# Patient Record
Sex: FEMALE | Race: WHITE | Employment: FULL TIME | URBAN - METROPOLITAN AREA
[De-identification: names, ages, dates, MRNs, and addresses within clinical notes are randomized per-mention and may not be internally consistent; named-entity substitution may affect disease eponyms.]

---

## 2019-05-29 LAB
HBSAG, EXTERNAL: NEGATIVE
HEPATITIS C AB,   EXT: NORMAL
HIV, EXTERNAL: NORMAL
RPR, EXTERNAL: NORMAL
RUBELLA, EXTERNAL: NORMAL

## 2019-08-14 PROBLEM — O99.340 DEPRESSION AFFECTING PREGNANCY: Status: ACTIVE | Noted: 2019-08-14

## 2019-08-14 PROBLEM — O09.522 MULTIGRAVIDA OF ADVANCED MATERNAL AGE IN SECOND TRIMESTER: Status: ACTIVE | Noted: 2019-08-14

## 2019-08-14 PROBLEM — O09.529 ADVANCED MATERNAL AGE IN MULTIGRAVIDA: Status: ACTIVE | Noted: 2019-08-14

## 2019-08-14 PROBLEM — F32.A DEPRESSION AFFECTING PREGNANCY: Status: ACTIVE | Noted: 2019-08-14

## 2019-09-16 PROBLEM — O26.892 HEADACHE IN PREGNANCY, ANTEPARTUM, SECOND TRIMESTER: Status: ACTIVE | Noted: 2019-09-16

## 2019-09-16 PROBLEM — O99.712: Status: ACTIVE | Noted: 2019-09-16

## 2019-09-16 PROBLEM — L29.9: Status: ACTIVE | Noted: 2019-09-16

## 2019-09-16 PROBLEM — R51.9 HEADACHE IN PREGNANCY, ANTEPARTUM, SECOND TRIMESTER: Status: ACTIVE | Noted: 2019-09-16

## 2019-10-09 PROBLEM — O35.9XX0 SUSPECTED FETAL ANOMALY, ANTEPARTUM: Status: ACTIVE | Noted: 2019-10-09

## 2019-10-31 PROBLEM — O09.523 MULTIGRAVIDA OF ADVANCED MATERNAL AGE IN THIRD TRIMESTER: Status: ACTIVE | Noted: 2019-08-14

## 2019-10-31 PROBLEM — O26.893 HEADACHE IN PREGNANCY, THIRD TRIMESTER: Status: ACTIVE | Noted: 2019-09-16

## 2019-10-31 PROBLEM — O99.713 PRURITUS OF PREGNANCY IN THIRD TRIMESTER: Status: ACTIVE | Noted: 2019-09-16

## 2019-11-05 PROBLEM — O40.3XX0 POLYHYDRAMNIOS IN THIRD TRIMESTER: Status: ACTIVE | Noted: 2019-11-05

## 2019-11-05 PROBLEM — Z87.42 HISTORY OF VULVODYNIA: Status: ACTIVE | Noted: 2019-11-05

## 2019-12-13 ENCOUNTER — HOSPITAL ENCOUNTER (EMERGENCY)
Age: 39
Discharge: HOME | End: 2019-12-13
Attending: OBSTETRICS & GYNECOLOGY | Admitting: OBSTETRICS & GYNECOLOGY
Payer: COMMERCIAL

## 2019-12-13 VITALS
HEART RATE: 91 BPM | TEMPERATURE: 98 F | RESPIRATION RATE: 20 BRPM | DIASTOLIC BLOOD PRESSURE: 79 MMHG | BODY MASS INDEX: 30.9 KG/M2 | SYSTOLIC BLOOD PRESSURE: 130 MMHG | WEIGHT: 181 LBS | HEIGHT: 64 IN

## 2019-12-13 PROBLEM — O26.893 PELVIC PAIN AFFECTING PREGNANCY IN THIRD TRIMESTER, ANTEPARTUM: Status: ACTIVE | Noted: 2019-12-13

## 2019-12-13 PROBLEM — R10.2 PELVIC PAIN AFFECTING PREGNANCY IN THIRD TRIMESTER, ANTEPARTUM: Status: ACTIVE | Noted: 2019-12-13

## 2019-12-13 LAB
GLUCOSE, GLUUPC: NORMAL
KETONES UR-MCNC: NORMAL MG/DL
PROT UR QL: NEGATIVE

## 2019-12-13 PROCEDURE — 99283 EMERGENCY DEPT VISIT LOW MDM: CPT

## 2019-12-13 PROCEDURE — 74011250636 HC RX REV CODE- 250/636: Performed by: OBSTETRICS & GYNECOLOGY

## 2019-12-13 PROCEDURE — 81002 URINALYSIS NONAUTO W/O SCOPE: CPT | Performed by: OBSTETRICS & GYNECOLOGY

## 2019-12-13 PROCEDURE — 74011250637 HC RX REV CODE- 250/637: Performed by: OBSTETRICS & GYNECOLOGY

## 2019-12-13 PROCEDURE — 96372 THER/PROPH/DIAG INJ SC/IM: CPT

## 2019-12-13 PROCEDURE — 59025 FETAL NON-STRESS TEST: CPT

## 2019-12-13 RX ORDER — HYDROCODONE BITARTRATE AND ACETAMINOPHEN 5; 325 MG/1; MG/1
1 TABLET ORAL
Status: COMPLETED | OUTPATIENT
Start: 2019-12-13 | End: 2019-12-13

## 2019-12-13 RX ORDER — BETAMETHASONE SODIUM PHOSPHATE AND BETAMETHASONE ACETATE 3; 3 MG/ML; MG/ML
12 INJECTION, SUSPENSION INTRA-ARTICULAR; INTRALESIONAL; INTRAMUSCULAR; SOFT TISSUE EVERY 24 HOURS
Status: DISCONTINUED | OUTPATIENT
Start: 2019-12-13 | End: 2019-12-13 | Stop reason: HOSPADM

## 2019-12-13 RX ADMIN — BETAMETHASONE SODIUM PHOSPHATE AND BETAMETHASONE ACETATE 12 MG: 3; 3 INJECTION, SUSPENSION INTRA-ARTICULAR; INTRALESIONAL; INTRAMUSCULAR at 15:10

## 2019-12-13 RX ADMIN — HYDROCODONE BITARTRATE AND ACETAMINOPHEN 1 TABLET: 5; 325 TABLET ORAL at 22:55

## 2019-12-13 NOTE — PROGRESS NOTES
Received phone call from [facility] office. Patient to arrive at 1500 for a celestone shot today and again to come back at 1500 tomorrow for second dose of celestone.

## 2019-12-13 NOTE — PROGRESS NOTES
Celestone given, see MAR. Patient instructed to come back tomorrow at this time, verbalizes understanding and agreement. Patient d/c home.

## 2019-12-14 ENCOUNTER — HOSPITAL ENCOUNTER (EMERGENCY)
Age: 39
Discharge: HOME | End: 2019-12-14
Attending: OBSTETRICS & GYNECOLOGY | Admitting: OBSTETRICS & GYNECOLOGY
Payer: COMMERCIAL

## 2019-12-14 PROCEDURE — 74011250636 HC RX REV CODE- 250/636: Performed by: OBSTETRICS & GYNECOLOGY

## 2019-12-14 PROCEDURE — 96372 THER/PROPH/DIAG INJ SC/IM: CPT

## 2019-12-14 RX ORDER — BETAMETHASONE SODIUM PHOSPHATE AND BETAMETHASONE ACETATE 3; 3 MG/ML; MG/ML
12 INJECTION, SUSPENSION INTRA-ARTICULAR; INTRALESIONAL; INTRAMUSCULAR; SOFT TISSUE
Status: COMPLETED | OUTPATIENT
Start: 2019-12-14 | End: 2019-12-14

## 2019-12-14 RX ORDER — BETAMETHASONE SODIUM PHOSPHATE AND BETAMETHASONE ACETATE 3; 3 MG/ML; MG/ML
12 INJECTION, SUSPENSION INTRA-ARTICULAR; INTRALESIONAL; INTRAMUSCULAR; SOFT TISSUE ONCE
Status: DISCONTINUED | OUTPATIENT
Start: 2019-12-14 | End: 2019-12-14

## 2019-12-14 RX ADMIN — BETAMETHASONE SODIUM PHOSPHATE AND BETAMETHASONE ACETATE 12 MG: 3; 3 INJECTION, SUSPENSION INTRA-ARTICULAR; INTRALESIONAL; INTRAMUSCULAR at 15:22

## 2019-12-14 NOTE — PROGRESS NOTES
Pt presents to L&D with complaints of tightening in abdomen and lower back pain most of the day. Pt states she has been drinking water and resting, but pain and discomfort continues. Dr. El Vora aware pt in FLAVIO 1.

## 2019-12-14 NOTE — DISCHARGE INSTRUCTIONS
Patient Education   Patient Education        Counting Your Baby's Kicks: Care Instructions  Your Care Instructions    Counting your baby's kicks is one way your doctor can tell that your baby is healthy. Most women--especially in a first pregnancy--feel their baby move for the first time between 16 and 22 weeks. The movement may feel like flutters rather than kicks. Your baby may move more at certain times of the day. When you are active, you may notice less kicking than when you are resting. At your prenatal visits, your doctor will ask whether the baby is active. In your last trimester, your doctor may ask you to count the number of times you feel your baby move. Follow-up care is a key part of your treatment and safety. Be sure to make and go to all appointments, and call your doctor if you are having problems. It's also a good idea to know your test results and keep a list of the medicines you take. How do you count fetal kicks? · A common method of checking your baby's movement is to count the number of kicks or moves you feel in 1 hour. Ten movements (such as kicks, flutters, or rolls) in 1 hour are normal. Some doctors suggest that you count in the morning until you get to 10 movements. Then you can quit for that day and start again the next day. · Pick your baby's most active time of day to count. This may be any time from morning to evening. · If you do not feel 10 movements in an hour, your baby may be sleeping. Wait for the next hour and count again. When should you call for help? Call your doctor now or seek immediate medical care if:    · You noticed that your baby has stopped moving or is moving much less than normal.    Watch closely for changes in your health, and be sure to contact your doctor if you have any problems. Where can you learn more? Go to http://austin-jose.info/.   Enter Y027 in the search box to learn more about \"Counting Your Baby's Kicks: Care Instructions. \"  Current as of: May 29, 2019  Content Version: 12.2  © 7243-1435 [employer]. Care instructions adapted under license by [employer] (which disclaims liability or warranty for this information). If you have questions about a medical condition or this instruction, always ask your healthcare professional. [address] any warranty or liability for your use of this information.  Labor: Care Instructions  Your Care Instructions     labor is the start of labor between 21 and 36 weeks of pregnancy. Most babies are born at 40 to 41 weeks of pregnancy. In labor, the uterus contracts to open the cervix. This is the first stage of childbirth.  labor can be caused by a problem with the baby, the mother, or both. Often the cause is not known. In some cases, doctors use medicines to try to delay labor until 29 or more weeks of pregnancy. By this time, a baby has grown enough so that problems are not likely. In some cases--such as with a serious infection--it is healthier for the baby to be born early. Your treatment will depend on how far along you are in your pregnancy and on your health and your baby's health. Follow-up care is a key part of your treatment and safety. Be sure to make and go to all appointments, and call your doctor if you are having problems. It's also a good idea to know your test results and keep a list of the medicines you take. How can you care for yourself at home? · If your doctor prescribed medicines, take them exactly as directed. Call your doctor if you think you are having a problem with your medicine. · Rest until your doctor advises you about activity. He or she will tell you if you should stay in bed most of the time. You may need to arrange for  if you have young children. · Do not have sexual intercourse unless your doctor says it is safe.   · Use pads, not tampons, if you have vaginal bleeding. · Make sure to drink plenty of fluids. Dehydration can lead to contractions. If you have kidney, heart, or liver disease and have to limit fluids, talk with your doctor before you increase the amount of fluids you drink. · Do not smoke or allow others to smoke around you. If you need help quitting, talk to your doctor about stop-smoking programs and medicines. These can increase your chances of quitting for good. When should you call for help? Call 911 anytime you think you may need emergency care. For example, call if:    · You passed out (lost consciousness).     · You have a seizure.     · You have severe vaginal bleeding.     · You have severe pain in your belly or pelvis.     · You have had fluid gushing or leaking from your vagina and you know or think the umbilical cord is bulging into your vagina. If this happens, immediately get down on your knees so your rear end (buttocks) is higher than your head. This will decrease the pressure on the cord until help arrives.   [facility] your doctor now or seek immediate medical care if:    · You have signs of preeclampsia, such as:  ? Sudden swelling of your face, hands, or feet. ? New vision problems (such as dimness, blurring, or seeing spots). ? A severe headache.     · You have any vaginal bleeding.     · You have belly pain or cramping.     · You have a fever.     · You have had regular contractions (with or without pain) for an hour. This means that you have 6 or more within 1 hour after you change your position and drink fluids.     · You have a sudden release of fluid from the vagina.     · You have low back pain or pelvic pressure that does not go away.     · You notice that your baby has stopped moving or is moving much less than normal.    Watch closely for changes in your health, and be sure to contact your doctor if you have any problems. Where can you learn more? Go to http://austin-jose.info/.   Enter Q400 in the search box to learn more about \" Labor: Care Instructions. \"  Current as of: May 29, 2019  Content Version: 12.2  © 7908-2345 [employer]. Care instructions adapted under license by [employer] (which disclaims liability or warranty for this information). If you have questions about a medical condition or this instruction, always ask your healthcare professional. [address] any warranty or liability for your use of this information.

## 2019-12-14 NOTE — PROGRESS NOTES
Patient discharged to home in stable condition with labor precautions and kick counts. Patient and significant other verbalize understanding of these.

## 2019-12-15 ENCOUNTER — HOSPITAL ENCOUNTER (OUTPATIENT)
Age: 39
Discharge: HOME | End: 2019-12-15
Attending: OBSTETRICS & GYNECOLOGY | Admitting: OBSTETRICS & GYNECOLOGY
Payer: COMMERCIAL

## 2019-12-15 VITALS
TEMPERATURE: 97.6 F | SYSTOLIC BLOOD PRESSURE: 126 MMHG | DIASTOLIC BLOOD PRESSURE: 73 MMHG | HEART RATE: 90 BPM | RESPIRATION RATE: 18 BRPM

## 2019-12-15 PROBLEM — O36.8130 DECREASED FETAL MOVEMENT AFFECTING MANAGEMENT OF PREGNANCY IN THIRD TRIMESTER: Status: ACTIVE | Noted: 2019-12-15

## 2019-12-15 PROCEDURE — 59025 FETAL NON-STRESS TEST: CPT

## 2019-12-15 PROCEDURE — 99282 EMERGENCY DEPT VISIT SF MDM: CPT

## 2019-12-15 RX ORDER — TEMAZEPAM 15 MG/1
CAPSULE ORAL
COMMUNITY

## 2019-12-15 NOTE — PROGRESS NOTES
Pt to room FLAVIO 1 for triage with chief complaint of decreased fetal movement and back pain for 3 weeks. Pt reports taking Restoril for sleep last night at 2200. She reports noticing a decrease in movement after taking medication. Assessment begins, EFM and [place] applied to a soft non tender abdomen and tracing well. Dr Patrica Clancy called to assess patient.

## 2019-12-15 NOTE — H&P
Chief Complaint: decreased FM      37 y.o. female  at 35w6d  weeks gestation who is seen for decreased FM this morning since taking restoril for insomnia last night. Pt denies VB, LOF, uterine ctx, abdominal pain, UTI or PEC symptoms. Pt is scheuled to see her PObP in 2 days. HISTORY:    Social History     Substance and Sexual Activity   Sexual Activity Not Currently    Partners: Male     Patient's last menstrual period was 03/15/2019.     Social History     Socioeconomic History    Marital status:      Spouse name: Not on file    Number of children: Not on file    Years of education: Not on file    Highest education level: Not on file   Occupational History    Not on file   Social Needs    Financial resource strain: Not on file    Food insecurity:     Worry: Not on file     Inability: Not on file    Transportation needs:     Medical: Not on file     Non-medical: Not on file   Tobacco Use    Smoking status: Never Smoker    Smokeless tobacco: Never Used   Substance and Sexual Activity    Alcohol use: No     Comment: very rarely, weekends    Drug use: No    Sexual activity: Not Currently     Partners: Male   Lifestyle    Physical activity:     Days per week: Not on file     Minutes per session: Not on file    Stress: Not on file   Relationships    Social connections:     Talks on phone: Not on file     Gets together: Not on file     Attends [employer] service: Not on file     Active member of club or organization: Not on file     Attends meetings of clubs or organizations: Not on file     Relationship status: Not on file    Intimate partner violence:     Fear of current or ex partner: Not on file     Emotionally abused: Not on file     Physically abused: Not on file     Forced sexual activity: Not on file   Other Topics Concern     Service Not Asked    Blood Transfusions Not Asked    Caffeine Concern Not Asked    Occupational Exposure Not Asked   [place] [employer] Not Asked    Sleep Concern Not Asked    Stress Concern Not Asked    Weight Concern Not Asked    Special Diet Not Asked    Back Care Not Asked    Exercise Not Asked    Bike Helmet Not Asked   [address] Not Asked    Self-Exams Not Asked   Social History Narrative    Not on file       Past Surgical History:   Procedure Laterality Date    HX BREAST AUGMENTATION      HX BREAST REDUCTION      HX DILATION AND CURETTAGE      HX GYN  2000    laser ablation for cervical dysplasia    HX OTHER SURGICAL      resection of melanoma right breast    HX OTHER SURGICAL      sinus surgery, 2 deviated septums (2016)    HX TYMPANOSTOMY Right     tube in ear twice for ETD       Past Medical History:   Diagnosis Date    Abnormal Pap smear 2000    with laser ablation     Abnormal Papanicolaou smear of cervix     ADHD (attention deficit hyperactivity disorder)     Allergic rhinitis 09/26/2016    Overactive ,sensitivities    Autoimmune disease ([facility] [address]     fibromyalgia    Bacterial infection     Carrier of ureaplasma urealyticum     Chronic sinusitis 9/26/2016    Depression     DNS (deviated nasal septum) 9/26/2016    Fibromyalgia     GERD (gastroesophageal reflux disease)     diet controlled    Hiatal hernia 9/26/2016    HSV (herpes simplex virus) anogenital infection     Hypertrophy of nasal turbinates 9/26/2016    IC (interstitial cystitis) Oct 2013    dx by Jerilyn Riggs,    [facility])     on right breast    Mycoplasma infection 2/10/2016    Nasal obstruction 9/26/2016    Nerve pain 9/26/2016    In face    Ovarian cyst     Rosacea 9/26/2016    Thyroid nodule 9/26/2016    Vulvodynia     hx         ROS:  An 8 point review of symptoms negative except for chief complaint as described above. PHYSICAL EXAM:  Blood pressure 126/73, pulse 90, temperature 97.6 °F (36.4 °C), resp. rate 18, last menstrual period 03/15/2019. Constitutional: The patient appears well, alert, oriented x 3.     Cardiovascular: Heart RRR, no murmurs. Respiratory: Lungs clear, no respiratory distress  GI: Abdomen soft, nontender, no guarding  No fundal tenderness  Musculoskeletal: no cva tenderness  Lower ext: no edema, neg darrick's, reflexes +2  Skin: no rashes or lesions  Psychiatric:Mood/ Affect: appropriate  Genitourinary: SVE: deferred  FHT: Category 1 with mod variability and + accels; reactive  TOCO: no ctx    I personally reviewed pt's medical record including relevant labs and ultrasounds  I reviewed the NST at today's encounter    Assessment/Plan:  Pt presents with decreased FM after taking restoril. Pt with ICP and has an appointment with her PObP in 2 days. Fetal well being is demonstrated. Pt discharged to home with labor and KK precautions. Pt to f/u with her PObP as per her appointment.

## 2019-12-15 NOTE — DISCHARGE INSTRUCTIONS
Patient Education       Patient Education        Pregnancy Precautions: Care Instructions  Your Care Instructions    There is no sure way to prevent labor before your due date ( labor) or to prevent most other pregnancy problems. But there are things you can do to increase your chances of a healthy pregnancy. Go to your appointments, follow your doctor's advice, and take good care of yourself. Eat well, and exercise (if your doctor agrees). And make sure to drink plenty of water. Follow-up care is a key part of your treatment and safety. Be sure to make and go to all appointments, and call your doctor if you are having problems. It's also a good idea to know your test results and keep a list of the medicines you take. How can you care for yourself at home? · Make sure you go to your prenatal appointments. At each visit, your doctor will check your blood pressure. Your doctor will also check to see if you have protein in your urine. High blood pressure and protein in urine are signs of preeclampsia. This condition can be dangerous for you and your baby. · Drink plenty of fluids, enough so that your urine is light yellow or clear like water. Dehydration can cause contractions. If you have kidney, heart, or liver disease and have to limit fluids, talk with your doctor before you increase the amount of fluids you drink. · Tell your doctor right away if you notice any symptoms of an infection, such as:  ? Burning when you urinate. ? A foul-smelling discharge from your vagina. ? Vaginal itching. ? Unexplained fever. ? Unusual pain or soreness in your uterus or lower belly. · Eat a balanced diet. Include plenty of foods that are high in calcium and iron. ? Foods high in calcium include milk, cheese, yogurt, almonds, and broccoli. ? Foods high in iron include red meat, shellfish, poultry, eggs, beans, raisins, whole-grain bread, and leafy green vegetables. · Do not smoke.  If you need help quitting, talk to your doctor about stop-smoking programs and medicines. These can increase your chances of quitting for good. · Do not drink alcohol or use illegal drugs. · Follow your doctor's directions about activity. Your doctor will let you know how much, if any, exercise you can do. · Ask your doctor if you can have sex. If you are at risk for early labor, your doctor may ask you to not have sex. · Take care to prevent falls. During pregnancy, your joints are loose, and your balance is off. Sports such as bicycling, skiing, or in-line skating can increase your risk of falling. And don't ride horses or motorcycles, dive, water ski, scuba dive, or parachute jump while you are pregnant. · Avoid getting very hot. Do not use saunas or hot tubs. Avoid staying out in the sun in hot weather for long periods. Take acetaminophen (Tylenol) to lower a high fever. · Do not take any over-the-counter or herbal medicines or supplements without talking to your doctor or pharmacist first.  When should you call for help? Call 911 anytime you think you may need emergency care. For example, call if:    · You passed out (lost consciousness).     · You have a seizure.     · You have severe vaginal bleeding.     · You have severe pain in your belly or pelvis.     · You have had fluid gushing or leaking from your vagina and you know or think the umbilical cord is bulging into your vagina. If this happens, immediately get down on your knees so your rear end (buttocks) is higher than your head. This will decrease the pressure on the cord until help arrives.   [facility] your doctor now or seek immediate medical care if:    · You have signs of preeclampsia, such as:  ? Sudden swelling of your face, hands, or feet. ? New vision problems (such as dimness, blurring, or seeing spots).   ? A severe headache.     · You have any vaginal bleeding.     · You have belly pain or cramping.     · You have a fever.     · You have had regular contractions (with or without pain) for an hour. This means that you have 8 or more within 1 hour or 4 or more in 20 minutes after you change your position and drink fluids.     · You have a sudden release of fluid from your vagina.     · You have low back pain or pelvic pressure that does not go away.     · You notice that your baby has stopped moving or is moving much less than normal.    Watch closely for changes in your health, and be sure to contact your doctor if you have any problems. Where can you learn more? Go to http://austin-jose.info/. Enter [id number] in the search box to learn more about \"Pregnancy Precautions: Care Instructions. \"  Current as of: May 29, 2019  Content Version: 12.2  © 3027-4942 [employer]. Care instructions adapted under license by [employer] (which disclaims liability or warranty for this information). If you have questions about a medical condition or this instruction, always ask your healthcare professional. [place] [address] any warranty or liability for your use of this information. Cholestasis of Pregnancy: Care Instructions  Your Care Instructions    Cholestasis of pregnancy is a liver problem. It makes your skin very itchy. It happens when bile doesn't flow out of the liver very well. This problem doesn't cause any serious health problems for a pregnant woman. But it may cause problems for your baby. Your doctor will want to watch you and your baby closely. To keep you both as healthy as possible, your doctor may recommend an early delivery. Sometimes doctors also recommend medicine. Medicine can reduce bile acids. After a baby is born, this problem goes away. Follow-up care is a key part of your treatment and safety. Be sure to make and go to all appointments, and call your doctor if you are having problems. It's also a good idea to know your test results and keep a list of the medicines you take.   How can you care for yourself at home? · Be safe with medicines. Take your medicines exactly as prescribed. Call your doctor if you think you are having a problem with your medicine. · If your doctor prescribes them, use creams or pills to help with itching. · Use calamine lotion on itchy areas. · Do not take hot showers or baths. Hot water can make itching worse. When should you call for help? Call your doctor now or seek immediate medical care if:    · Your itching gets worse or you get other symptoms.     · You think that you are in labor.     · There is a new or increasing yellow color to your skin or the whites of your eyes.    Watch closely for changes in your health, and be sure to contact your doctor if you have any questions or concerns. Where can you learn more? Go to http://austin-jose.info/. Enter Q761 in the search box to learn more about \"Cholestasis of Pregnancy: Care Instructions. \"  Current as of: May 29, 2019  Content Version: 12.2  © 3996-6679 [employer]. Care instructions adapted under license by [employer] (which disclaims liability or warranty for this information). If you have questions about a medical condition or this instruction, always ask your healthcare professional. [address] any warranty or liability for your use of this information.

## 2019-12-17 ENCOUNTER — ANESTHESIA EVENT (OUTPATIENT)
Dept: LABOR AND DELIVERY | Age: 39
End: 2019-12-17
Payer: COMMERCIAL

## 2019-12-17 ENCOUNTER — ANESTHESIA (OUTPATIENT)
Dept: LABOR AND DELIVERY | Age: 39
End: 2019-12-17
Payer: COMMERCIAL

## 2019-12-17 ENCOUNTER — HOSPITAL ENCOUNTER (INPATIENT)
Age: 39
LOS: 3 days | Discharge: HOME | End: 2019-12-20
Attending: OBSTETRICS & GYNECOLOGY | Admitting: OBSTETRICS & GYNECOLOGY
Payer: COMMERCIAL

## 2019-12-17 PROBLEM — O26.613 CHOLESTASIS DURING PREGNANCY IN THIRD TRIMESTER: Status: ACTIVE | Noted: 2019-12-17

## 2019-12-17 PROBLEM — O13.3 GESTATIONAL HYPERTENSION WITHOUT SIGNIFICANT PROTEINURIA IN THIRD TRIMESTER: Status: ACTIVE | Noted: 2019-12-17

## 2019-12-17 PROBLEM — K83.1 CHOLESTASIS DURING PREGNANCY IN THIRD TRIMESTER: Status: ACTIVE | Noted: 2019-12-17

## 2019-12-17 PROBLEM — Z3A.36 36 WEEKS GESTATION OF PREGNANCY: Status: ACTIVE | Noted: 2019-12-17

## 2019-12-17 LAB
ABO + RH BLD: NORMAL
ARTERIAL PATENCY WRIST A: ABNORMAL
BASE DEFICIT BLD-SCNC: 3 MMOL/L
BDY SITE: ABNORMAL
BLOOD GROUP ANTIBODIES SERPL: NORMAL
BODY TEMPERATURE: 98.6
CO2 BLD-SCNC: 25 MMOL/L
ERYTHROCYTE [DISTWIDTH] IN BLOOD BY AUTOMATED COUNT: 13.6 % (ref 11.9–14.6)
GAS FLOW.O2 O2 DELIVERY SYS: ABNORMAL L/MIN
HCO3 BLD-SCNC: 23.7 MMOL/L (ref 22–26)
HCT VFR BLD AUTO: 28.1 % (ref 35.8–46.3)
HGB BLD-MCNC: 8.9 G/DL (ref 11.7–15.4)
MCH RBC QN AUTO: 27.6 PG (ref 26.1–32.9)
MCHC RBC AUTO-ENTMCNC: 31.7 G/DL (ref 31.4–35)
MCV RBC AUTO: 87 FL (ref 79.6–97.8)
NRBC # BLD: 0 K/UL (ref 0–0.2)
PCO2 BLDCO: 46 MMHG (ref 32–68)
PH BLDCO: 7.32 [PH] (ref 7.15–7.38)
PLATELET # BLD AUTO: 211 K/UL (ref 150–450)
PMV BLD AUTO: 10.2 FL (ref 9.4–12.3)
PO2 BLDCO: 17 MMHG
RBC # BLD AUTO: 3.23 M/UL (ref 4.05–5.2)
SAO2 % BLD: 21 % (ref 95–98)
SERVICE CMNT-IMP: ABNORMAL
SPECIMEN EXP DATE BLD: NORMAL
SPECIMEN TYPE: ABNORMAL
WBC # BLD AUTO: 12 K/UL (ref 4.3–11.1)

## 2019-12-17 PROCEDURE — 74011000250 HC RX REV CODE- 250: Performed by: ANESTHESIOLOGY

## 2019-12-17 PROCEDURE — 77030018836 HC SOL IRR NACL ICUM -A: Performed by: OBSTETRICS & GYNECOLOGY

## 2019-12-17 PROCEDURE — 75410000003 HC RECOV DEL/VAG/CSECN EA 0.5 HR: Performed by: OBSTETRICS & GYNECOLOGY

## 2019-12-17 PROCEDURE — 74011250636 HC RX REV CODE- 250/636: Performed by: NURSE ANESTHETIST, CERTIFIED REGISTERED

## 2019-12-17 PROCEDURE — 65270000029 HC RM PRIVATE

## 2019-12-17 PROCEDURE — 77030007880 HC KT SPN EPDRL BBMI -B: Performed by: NURSE ANESTHETIST, CERTIFIED REGISTERED

## 2019-12-17 PROCEDURE — 77030002966 HC SUT PDS J&J -A: Performed by: OBSTETRICS & GYNECOLOGY

## 2019-12-17 PROCEDURE — 74011250636 HC RX REV CODE- 250/636: Performed by: ANESTHESIOLOGY

## 2019-12-17 PROCEDURE — 77030002974 HC SUT PLN J&J -A: Performed by: OBSTETRICS & GYNECOLOGY

## 2019-12-17 PROCEDURE — 85027 COMPLETE CBC AUTOMATED: CPT

## 2019-12-17 PROCEDURE — 36415 COLL VENOUS BLD VENIPUNCTURE: CPT

## 2019-12-17 PROCEDURE — 76060000078 HC EPIDURAL ANESTHESIA: Performed by: OBSTETRICS & GYNECOLOGY

## 2019-12-17 PROCEDURE — 77030002933 HC SUT MCRYL J&J -A: Performed by: OBSTETRICS & GYNECOLOGY

## 2019-12-17 PROCEDURE — 86900 BLOOD TYPING SEROLOGIC ABO: CPT

## 2019-12-17 PROCEDURE — 77030003665 HC NDL SPN BBMI -A: Performed by: NURSE ANESTHETIST, CERTIFIED REGISTERED

## 2019-12-17 PROCEDURE — 77030032490 HC SLV COMPR SCD KNE COVD -B: Performed by: OBSTETRICS & GYNECOLOGY

## 2019-12-17 PROCEDURE — 74011250637 HC RX REV CODE- 250/637

## 2019-12-17 PROCEDURE — 74011250636 HC RX REV CODE- 250/636: Performed by: OBSTETRICS & GYNECOLOGY

## 2019-12-17 PROCEDURE — 4A1HXCZ MONITORING OF PRODUCTS OF CONCEPTION, CARDIAC RATE, EXTERNAL APPROACH: ICD-10-PCS | Performed by: OBSTETRICS & GYNECOLOGY

## 2019-12-17 PROCEDURE — 76010000392 HC C SECN EA ADDL 0.5 HR: Performed by: OBSTETRICS & GYNECOLOGY

## 2019-12-17 PROCEDURE — 82803 BLOOD GASES ANY COMBINATION: CPT

## 2019-12-17 PROCEDURE — 77030031139 HC SUT VCRL2 J&J -A: Performed by: OBSTETRICS & GYNECOLOGY

## 2019-12-17 PROCEDURE — 74011250636 HC RX REV CODE- 250/636

## 2019-12-17 PROCEDURE — 77030020255 HC SOL INJ LR 1000ML BG

## 2019-12-17 PROCEDURE — 74011000258 HC RX REV CODE- 258: Performed by: OBSTETRICS & GYNECOLOGY

## 2019-12-17 PROCEDURE — 77030018846 HC SOL IRR STRL H20 ICUM -A: Performed by: OBSTETRICS & GYNECOLOGY

## 2019-12-17 PROCEDURE — 74011000250 HC RX REV CODE- 250: Performed by: NURSE ANESTHETIST, CERTIFIED REGISTERED

## 2019-12-17 PROCEDURE — 77030034696 HC CATH URETH FOL 2W BARD -A: Performed by: OBSTETRICS & GYNECOLOGY

## 2019-12-17 PROCEDURE — 76010000391 HC C SECN FIRST 1 HR: Performed by: OBSTETRICS & GYNECOLOGY

## 2019-12-17 RX ORDER — SODIUM CHLORIDE 9 MG/ML
50 INJECTION, SOLUTION INTRAVENOUS CONTINUOUS
Status: DISCONTINUED | OUTPATIENT
Start: 2019-12-17 | End: 2019-12-18

## 2019-12-17 RX ORDER — URSODIOL 300 MG/1
300 CAPSULE ORAL 3 TIMES DAILY
Status: DISCONTINUED | OUTPATIENT
Start: 2019-12-17 | End: 2019-12-20 | Stop reason: HOSPADM

## 2019-12-17 RX ORDER — HYDROMORPHONE HYDROCHLORIDE 1 MG/ML
1 INJECTION, SOLUTION INTRAMUSCULAR; INTRAVENOUS; SUBCUTANEOUS
Status: DISCONTINUED | OUTPATIENT
Start: 2019-12-17 | End: 2019-12-18

## 2019-12-17 RX ORDER — KETOROLAC TROMETHAMINE 30 MG/ML
INJECTION, SOLUTION INTRAMUSCULAR; INTRAVENOUS AS NEEDED
Status: DISCONTINUED | OUTPATIENT
Start: 2019-12-17 | End: 2019-12-17 | Stop reason: HOSPADM

## 2019-12-17 RX ORDER — TRISODIUM CITRATE DIHYDRATE AND CITRIC ACID MONOHYDRATE 500; 334 MG/5ML; MG/5ML
30 SOLUTION ORAL ONCE
Status: COMPLETED | OUTPATIENT
Start: 2019-12-17 | End: 2019-12-17

## 2019-12-17 RX ORDER — SODIUM CHLORIDE 0.9 % (FLUSH) 0.9 %
5-40 SYRINGE (ML) INJECTION AS NEEDED
Status: DISCONTINUED | OUTPATIENT
Start: 2019-12-17 | End: 2019-12-19

## 2019-12-17 RX ORDER — OXYTOCIN/RINGER'S LACTATE 30/500 ML
250 PLASTIC BAG, INJECTION (ML) INTRAVENOUS ONCE
Status: DISCONTINUED | OUTPATIENT
Start: 2019-12-17 | End: 2019-12-17 | Stop reason: HOSPADM

## 2019-12-17 RX ORDER — ONDANSETRON 2 MG/ML
INJECTION INTRAMUSCULAR; INTRAVENOUS AS NEEDED
Status: DISCONTINUED | OUTPATIENT
Start: 2019-12-17 | End: 2019-12-17 | Stop reason: HOSPADM

## 2019-12-17 RX ORDER — TRISODIUM CITRATE DIHYDRATE AND CITRIC ACID MONOHYDRATE 500; 334 MG/5ML; MG/5ML
SOLUTION ORAL
Status: COMPLETED
Start: 2019-12-17 | End: 2019-12-17

## 2019-12-17 RX ORDER — ACETAMINOPHEN 500 MG
1000 TABLET ORAL
Status: DISCONTINUED | OUTPATIENT
Start: 2019-12-17 | End: 2019-12-18

## 2019-12-17 RX ORDER — CEFAZOLIN SODIUM/WATER 2 G/20 ML
2 SYRINGE (ML) INTRAVENOUS ONCE
Status: COMPLETED | OUTPATIENT
Start: 2019-12-17 | End: 2019-12-17

## 2019-12-17 RX ORDER — NALBUPHINE HYDROCHLORIDE 10 MG/ML
5 INJECTION, SOLUTION INTRAMUSCULAR; INTRAVENOUS; SUBCUTANEOUS
Status: DISCONTINUED | OUTPATIENT
Start: 2019-12-17 | End: 2019-12-18

## 2019-12-17 RX ORDER — DEXTROSE, SODIUM CHLORIDE, SODIUM LACTATE, POTASSIUM CHLORIDE, AND CALCIUM CHLORIDE 5; .6; .31; .03; .02 G/100ML; G/100ML; G/100ML; G/100ML; G/100ML
125 INJECTION, SOLUTION INTRAVENOUS CONTINUOUS
Status: DISCONTINUED | OUTPATIENT
Start: 2019-12-17 | End: 2019-12-17 | Stop reason: HOSPADM

## 2019-12-17 RX ORDER — BUPIVACAINE HYDROCHLORIDE 7.5 MG/ML
INJECTION, SOLUTION INTRASPINAL
Status: COMPLETED | OUTPATIENT
Start: 2019-12-17 | End: 2019-12-17

## 2019-12-17 RX ORDER — SODIUM CHLORIDE 0.9 % (FLUSH) 0.9 %
5-40 SYRINGE (ML) INJECTION AS NEEDED
Status: DISCONTINUED | OUTPATIENT
Start: 2019-12-17 | End: 2019-12-17 | Stop reason: HOSPADM

## 2019-12-17 RX ORDER — OXYCODONE HYDROCHLORIDE 5 MG/1
10 TABLET ORAL
Status: DISCONTINUED | OUTPATIENT
Start: 2019-12-17 | End: 2019-12-18

## 2019-12-17 RX ORDER — SODIUM CHLORIDE 0.9 % (FLUSH) 0.9 %
5-40 SYRINGE (ML) INJECTION EVERY 8 HOURS
Status: DISCONTINUED | OUTPATIENT
Start: 2019-12-17 | End: 2019-12-19

## 2019-12-17 RX ORDER — KETOROLAC TROMETHAMINE 30 MG/ML
30 INJECTION, SOLUTION INTRAMUSCULAR; INTRAVENOUS
Status: DISCONTINUED | OUTPATIENT
Start: 2019-12-17 | End: 2019-12-18

## 2019-12-17 RX ORDER — DIPHENHYDRAMINE HYDROCHLORIDE 50 MG/ML
12.5 INJECTION, SOLUTION INTRAMUSCULAR; INTRAVENOUS
Status: DISCONTINUED | OUTPATIENT
Start: 2019-12-17 | End: 2019-12-18

## 2019-12-17 RX ORDER — EPHEDRINE SULFATE/0.9% NACL/PF 50 MG/5 ML
SYRINGE (ML) INTRAVENOUS AS NEEDED
Status: DISCONTINUED | OUTPATIENT
Start: 2019-12-17 | End: 2019-12-17 | Stop reason: HOSPADM

## 2019-12-17 RX ORDER — SODIUM CHLORIDE, SODIUM LACTATE, POTASSIUM CHLORIDE, CALCIUM CHLORIDE 600; 310; 30; 20 MG/100ML; MG/100ML; MG/100ML; MG/100ML
150 INJECTION, SOLUTION INTRAVENOUS CONTINUOUS
Status: DISCONTINUED | OUTPATIENT
Start: 2019-12-17 | End: 2019-12-17 | Stop reason: HOSPADM

## 2019-12-17 RX ORDER — SODIUM CHLORIDE, SODIUM LACTATE, POTASSIUM CHLORIDE, CALCIUM CHLORIDE 600; 310; 30; 20 MG/100ML; MG/100ML; MG/100ML; MG/100ML
125 INJECTION, SOLUTION INTRAVENOUS CONTINUOUS
Status: DISCONTINUED | OUTPATIENT
Start: 2019-12-17 | End: 2019-12-18

## 2019-12-17 RX ORDER — SODIUM CHLORIDE, SODIUM LACTATE, POTASSIUM CHLORIDE, CALCIUM CHLORIDE 600; 310; 30; 20 MG/100ML; MG/100ML; MG/100ML; MG/100ML
150 INJECTION, SOLUTION INTRAVENOUS CONTINUOUS
Status: DISCONTINUED | OUTPATIENT
Start: 2019-12-17 | End: 2019-12-18

## 2019-12-17 RX ORDER — MORPHINE SULFATE 1 MG/ML
INJECTION, SOLUTION EPIDURAL; INTRATHECAL; INTRAVENOUS
Status: COMPLETED | OUTPATIENT
Start: 2019-12-17 | End: 2019-12-17

## 2019-12-17 RX ORDER — SODIUM CHLORIDE 0.9 % (FLUSH) 0.9 %
5-40 SYRINGE (ML) INJECTION EVERY 8 HOURS
Status: DISCONTINUED | OUTPATIENT
Start: 2019-12-17 | End: 2019-12-17 | Stop reason: HOSPADM

## 2019-12-17 RX ORDER — FLUOXETINE HYDROCHLORIDE 20 MG/1
20 CAPSULE ORAL DAILY
Status: DISCONTINUED | OUTPATIENT
Start: 2019-12-18 | End: 2019-12-20 | Stop reason: HOSPADM

## 2019-12-17 RX ORDER — NALOXONE HYDROCHLORIDE 0.4 MG/ML
0.2 INJECTION, SOLUTION INTRAMUSCULAR; INTRAVENOUS; SUBCUTANEOUS
Status: DISCONTINUED | OUTPATIENT
Start: 2019-12-17 | End: 2019-12-18

## 2019-12-17 RX ORDER — OXYTOCIN/RINGER'S LACTATE 30/500 ML
PLASTIC BAG, INJECTION (ML) INTRAVENOUS
Status: DISCONTINUED | OUTPATIENT
Start: 2019-12-17 | End: 2019-12-17 | Stop reason: HOSPADM

## 2019-12-17 RX ORDER — ONDANSETRON 2 MG/ML
4 INJECTION INTRAMUSCULAR; INTRAVENOUS
Status: DISCONTINUED | OUTPATIENT
Start: 2019-12-17 | End: 2019-12-18

## 2019-12-17 RX ADMIN — Medication 500 ML/HR: at 07:59

## 2019-12-17 RX ADMIN — PHENYLEPHRINE HYDROCHLORIDE 100 MCG: 10 INJECTION INTRAVENOUS at 08:17

## 2019-12-17 RX ADMIN — ONDANSETRON 4 MG: 2 INJECTION INTRAMUSCULAR; INTRAVENOUS at 08:07

## 2019-12-17 RX ADMIN — DIPHENHYDRAMINE HYDROCHLORIDE 12.5 MG: 50 INJECTION, SOLUTION INTRAMUSCULAR; INTRAVENOUS at 12:40

## 2019-12-17 RX ADMIN — KETOROLAC TROMETHAMINE 30 MG: 30 INJECTION, SOLUTION INTRAMUSCULAR at 14:06

## 2019-12-17 RX ADMIN — Medication 2 G: at 07:01

## 2019-12-17 RX ADMIN — ONDANSETRON 4 MG: 2 INJECTION INTRAMUSCULAR; INTRAVENOUS at 14:19

## 2019-12-17 RX ADMIN — PHENYLEPHRINE HYDROCHLORIDE 100 MCG: 10 INJECTION INTRAVENOUS at 08:14

## 2019-12-17 RX ADMIN — SODIUM CHLORIDE, SODIUM LACTATE, POTASSIUM CHLORIDE, AND CALCIUM CHLORIDE: 600; 310; 30; 20 INJECTION, SOLUTION INTRAVENOUS at 07:54

## 2019-12-17 RX ADMIN — Medication 10 MG: at 07:56

## 2019-12-17 RX ADMIN — PHENYLEPHRINE HYDROCHLORIDE 100 MCG: 10 INJECTION INTRAVENOUS at 08:13

## 2019-12-17 RX ADMIN — SODIUM CHLORIDE 125 MG: 900 INJECTION, SOLUTION INTRAVENOUS at 12:46

## 2019-12-17 RX ADMIN — KETOROLAC TROMETHAMINE 30 MG: 30 INJECTION, SOLUTION INTRAMUSCULAR at 20:34

## 2019-12-17 RX ADMIN — SODIUM CHLORIDE, SODIUM LACTATE, POTASSIUM CHLORIDE, AND CALCIUM CHLORIDE 125 ML/HR: 600; 310; 30; 20 INJECTION, SOLUTION INTRAVENOUS at 19:00

## 2019-12-17 RX ADMIN — FAMOTIDINE 20 MG: 10 INJECTION, SOLUTION INTRAVENOUS at 07:04

## 2019-12-17 RX ADMIN — NALBUPHINE HYDROCHLORIDE 5 MG: 10 INJECTION, SOLUTION INTRAMUSCULAR; INTRAVENOUS; SUBCUTANEOUS at 10:42

## 2019-12-17 RX ADMIN — PHENYLEPHRINE HYDROCHLORIDE 100 MCG: 10 INJECTION INTRAVENOUS at 07:53

## 2019-12-17 RX ADMIN — TRISODIUM CITRATE DIHYDRATE AND CITRIC ACID MONOHYDRATE 30 ML: 500; 334 SOLUTION ORAL at 07:01

## 2019-12-17 RX ADMIN — PHENYLEPHRINE HYDROCHLORIDE 100 MCG: 10 INJECTION INTRAVENOUS at 07:54

## 2019-12-17 RX ADMIN — SODIUM CITRATE AND CITRIC ACID MONOHYDRATE 30 ML: 500; 334 SOLUTION ORAL at 07:01

## 2019-12-17 RX ADMIN — HYDROMORPHONE HYDROCHLORIDE 1 MG: 1 INJECTION, SOLUTION INTRAMUSCULAR; INTRAVENOUS; SUBCUTANEOUS at 21:29

## 2019-12-17 RX ADMIN — PROMETHAZINE HYDROCHLORIDE 3.25 MG: 25 INJECTION INTRAMUSCULAR; INTRAVENOUS at 10:42

## 2019-12-17 RX ADMIN — MORPHINE SULFATE 0.25 MG: 1 INJECTION, SOLUTION EPIDURAL; INTRATHECAL; INTRAVENOUS at 07:41

## 2019-12-17 RX ADMIN — BUPIVACAINE HYDROCHLORIDE IN DEXTROSE 10 MG: 7.5 INJECTION, SOLUTION SUBARACHNOID at 07:41

## 2019-12-17 RX ADMIN — KETOROLAC TROMETHAMINE 30 MG: 30 INJECTION, SOLUTION INTRAMUSCULAR; INTRAVENOUS at 08:17

## 2019-12-17 NOTE — H&P
History & Physical    Name: Amrita Grider MRN: [id number]  SSN: [id number]    YOB: 1980  Age: 37 y.o. Sex: female        Subjective:     Estimated Date of Delivery: 20  OB History    Para Term  AB Living   5 1 1 0 3 0   SAB TAB Ectopic Molar Multiple Live Births   0 0 0 0 0 1      # Outcome Date GA Lbr Joe/2nd Weight Sex Delivery Anes PTL Lv   5 Current            4 Term  42w0d  4.026 kg F Vag-Spont  N ND   3 AB            2 AB            1 AB                Ms. lCaudia Herrmann is admitted with pregnancy at 36w1d for  Section. Prenatal course was complicated by advanced maternal age, elevated blood pressure in physician's office , pregnancy induced hypertension and progressively worsing Cholestasis of pregnancy. Please see prenatal records for details.     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 ureaplasma urealyticum     Cholestasis during pregnancy in third trimester 2019    Chronic sinusitis 2016    Depression     DNS (deviated nasal septum) 2016    Fibromyalgia     GERD (gastroesophageal reflux disease)     diet controlled    Gestational hypertension without significant proteinuria in third trimester 2019    Hiatal hernia 2016    HSV (herpes simplex virus) anogenital infection     Hypertrophy of nasal turbinates 2016    IC (interstitial cystitis) Oct 2013    dx by Joni Hernandez,    Melanoma [facility])     on right breast    Mycoplasma infection 2/10/2016    Nasal obstruction 2016    Nerve pain 2016    In face    Ovarian cyst     Rosacea 2016    Thyroid nodule 2016    Vulvodynia     hx     Past Surgical History: Procedure Laterality Date    HX BREAST AUGMENTATION      HX BREAST REDUCTION      HX DILATION AND CURETTAGE      HX GYN  2000    laser ablation for cervical dysplasia    HX OTHER SURGICAL      resection of melanoma right breast    HX OTHER SURGICAL      sinus surgery, 2 deviated septums (2016)    HX TYMPANOSTOMY Right     tube in ear twice for ETD     Social History     Occupational History    Not on file   Tobacco Use    Smoking status: Never Smoker    Smokeless tobacco: Never Used   Substance and Sexual Activity    Alcohol use: No     Comment: very rarely, weekends    Drug use: No    Sexual activity: Not Currently     Partners: Male     Family History   Problem Relation Age of Onset    Liver Disease Mother         Hep C    Liver Disease Father         Hep C    Asthma Brother        Allergies   Allergen Reactions    Latex Rash    Augmentin [Amoxicillin-Pot Clavulanate] Rash    Cymbalta [Duloxetine] Other (comments)     Sloughing inside of mouth    Hydroxyzine Hcl Other (comments)     headaches    Other Medication Unknown (comments)     Steroid creams    Other Plant, Animal, Environmental Unknown (comments)     Pollen, Grass, Dust, Animal dander, Chemicals (household  - eczema blisters on hands)    Zithromax [Azithromycin] Swelling    Zyrtec [Cetirizine] Swelling     Prior to Admission medications    Medication Sig Start Date End Date Taking? Authorizing Provider   temazepam (RESTORIL) 15 mg capsule Take  by mouth nightly as needed for Sleep. Yes Provider, Historical   famotidine (PEPCID) 20 mg tablet Take 1 Tab by mouth two (2) times a day. Indications: heartburn 11/18/19  Yes Violetta Schwartz MD   ursodiol (ACTIGALL) 300 mg capsule Take 1 Cap by mouth three (3) times daily.  Indications: Intrahepatic Cholestasis of Pregnancy 11/5/19  Yes Augustina Wade MD   butalbital-acetaminophen (PHRENILIN)  mg tablet Take 2 Tabs by mouth every six to eight (6-8) hours as needed (headache). Indications: Tension Headache 10/9/19  Yes Coni Chinchilla MD   FLUoxetine (PROZAC) 20 mg capsule Take  by mouth daily. Yes Provider, Historical   [id number] lan-folic-dha (PRENATAL DHA+COMPLETE PRENATAL) [id number] mg-mcg-mg cmpk Take  by mouth. Yes Provider, Historical   loratadine-pseudoephedrine (CLARITIN-D 12 HOUR) 5-120 mg per tablet Take 1 Tab by mouth two (2) times a day. Indications: ALLERGIC RHINITIS   Yes Provider, Historical   nystatin-triamcinolone (MYCOLOG) 100,000-0.1 unit/gram-% ointment Apply 1 each to affected area two (2) times a day. Patient taking differently: Apply 1 Each to affected area two (2) times a day. Indications: CUTANEOUS CANDIDIASIS 1/7/15  Yes Rashard Epstein MD        Review of Systems: A comprehensive review of systems was negative except for that written in the HPI. Objective:     Vitals:  Vitals:    19 0611   BP: 121/76   Pulse: 85   Resp: 18   Temp: 97.5 °F (36.4 °C)        Physical Exam:  Patient without distress. Heart: Regular rate and rhythm  Lung: clear to auscultation throughout lung fields, no wheezes, no rales, no rhonchi and normal respiratory effort  Abdomen: soft, nontender  Membranes:  Intact  Fetal Heart Rate: Reactive    Prenatal Labs:   Lab Results   Component Value Date/Time    Rubella, External Non-Immune 2019    HBsAg, External Negative 2019    HIV, External Non-Reactive 2019    RPR, External Non-Reactive 2019         Assessment/Plan:     Principal Problem:    Cholestasis during pregnancy in third trimester (2019)    Active Problems:    36 weeks gestation of pregnancy (2019)      Gestational hypertension without significant proteinuria in third trimester (2019)         Plan: Admit for Reassuring fetal status, Proceed with  Section Reassuring fetal status with plan for  section due to maternal vaginal issues and h/o demise, findings consistent with failure of dilatation. Recommended proceeding with  delivery. Risks of bleeding, infection, bladder and bowel damage explained to patient and . They understand the situation and consent to the  delivery. .  Group B Strep was not tested.

## 2019-12-17 NOTE — OP NOTES
[address] [address]  OPERATIVE REPORT    Name:  Vinicius Ndiaye  MR#:  [id number]  :  1980  ACCOUNT #:  [de-identified]  DATE OF SERVICE:  2019    PREOPERATIVE DIAGNOSES:  A 36-1/7-week intrauterine pregnancy with worsening cholestasis of pregnancy, advanced maternal age, and gestational hypertension with history of vulvodynia desiring primary  section, received steroids prior to  section. POSTOPERATIVE DIAGNOSES:  A 36-1/7-week intrauterine pregnancy with worsening cholestasis of pregnancy, advanced maternal age, and gestational hypertension with history of vulvodynia desiring primary  section, received steroids prior to  section with operative delivery at 7:57 a.m. on 2019, with delivery of a female infant with Apgars 9 and 9 weighing 2.59 kg, named Tony Zayas to [facility] due to some trouble maintaining oxygen saturation. PROCEDURE:  Primary low transverse . SURGEON:  Shanice Paz. Ellis Luna MD    ASSISTANT:  none. ANESTHESIA:  Spinal.    COMPLICATIONS:  None. SPECIMENS REMOVED:  None. IMPLANTS:  none. ESTIMATED BLOOD LOSS:  700 mL. DRAINS:  Chung. FINDINGS:  Fairly normal uterus, tubes, and ovaries. PROCEDURE:  After informed consent was obtained, the patient was taken to the OR and good working level obtained on her spinal.  She was prepped and draped in the usual sterile fashion. The transverse Pfannenstiel skin incision was made, carried down to the fascia, nicked in the midline, extended up bilaterally, and dissected superiorly and inferiorly up to rectus muscles. All bleeding controlled with electrocautery. Peritoneum was entered in the midline, extended up superiorly, down inferiorly. A bladder flap was then created. Hysterotomy incision made. Infant's head delivered up on the abdomen and bulb suctioned and delayed clamping and milking was performed and then the cord was clamped and cut.   Infant was passed off to the awaiting attendants, who awarded both Apgars. The placenta was extracted. Uterus was exteriorized and wrapped in a wet pad and dry curetted. The uterine incision was closed in 2 layers, the first running of Vicryl and the second imbricating of Monocryl with good hemostasis obtained. The uterus returned to its original place in the abdominal cavity. Both paracolic gutters were then irrigated. Clots removed. The bladder flap was closed with some 3-0 Monocryl. Anterior peritoneum was closed with 2-0 Monocryl. Rectus muscles were irrigated, bleeding controlled with electrocautery, and reapproximated the fascia with a 0 PDS in a running fashion x2. Subcutaneous tissue was irrigated, bleeding controlled with electrocautery and reapproximated with 2-0 plain. Skin was closed with subcuticular 4-0 Monocryl. All needle, instrument, and sponge counts correct x2. The patient went back to recovery room in good condition and specimen sent to Pathology.       Darvin Yoder MD      [id number]  D:  12/17/2019 8:57  T:  12/17/2019 12:54  JOB #:  [id number]  CC:  [id number] [employer]

## 2019-12-17 NOTE — PROGRESS NOTES
Attended , baby delivered 46. Baby cried, stimulated and warmed. No oxygen needed but on standby if needed. No delay or complications.

## 2019-12-17 NOTE — ROUTINE PROCESS
SBAR OUT Report: Mother Verbal report given to JORDAN Tan RN (full name & credentials) on this patient, who is now being transferred to MIU (unit) for routine progression of care. The patient is wearing a green \"Anesthesia-Duramorph\" band. Report consisted of patient's Situation, Background, Assessment and Recommendations (SBAR). Pinson ID bands were compared with the identification form, and verified with the patient and receiving nurse. Information from the SBAR and the [facility] Report was reviewed with the receiving nurse; opportunity for questions and clarification provided.

## 2019-12-17 NOTE — PROGRESS NOTES
SBAR IN Report: Mother    Verbal report received from Harry Huerta RN  on this patient, who is now being transferred from L&D (unit) for routine progression of care. The patient is wearing a green \"Anesthesia-Duramorph\" band. Report consisted of patient's Situation, Background, Assessment and Recommendations (SBAR).  ID bands were compared with the identification form, and verified with the patient and transferring nurse. Information from the SBAR and Procedure Summary and the [facility] Report was reviewed with the transferring nurse; opportunity for questions and clarification provided.

## 2019-12-17 NOTE — ANESTHESIA PROCEDURE NOTES
Spinal Block    Start time: 12/17/2019 7:35 AM  End time: 12/17/2019 7:41 AM  Performed by: Berny Gonzalez MD  Authorized by: Berny Gonzalez MD     Pre-procedure:   Indications: primary anesthetic  Preanesthetic Checklist: patient identified, risks and benefits discussed, anesthesia consent, patient being monitored and timeout performed    Timeout Time: 07:35          Spinal Block:   Patient Position:  Seated  Prep Region:  Lumbar  Prep: chlorhexidine      Location:  L3-4  Technique:  Single shot    Local Dose (mL):  3    Needle:   Needle Type:  Pencan  Needle Gauge:  25 G  Attempts:  1      Events: CSF confirmed, no blood with aspiration and no paresthesia        Assessment:  Insertion:  Uncomplicated  Patient tolerance:  Patient tolerated the procedure well with no immediate complications  All needles out intact, procedure tolerated well without problems

## 2019-12-17 NOTE — LACTATION NOTE
Went to start mom pumping for baby in [facility]. Noted history of augmentation/reduction. Mom reports reduction history only in 2007. Mom states she has been feeling nauseated all day and does not want to start pumping now. Encouraged to start as soon as she is ready. The sooner the better. Pump set up in room. Mom to call out when she is ready.

## 2019-12-17 NOTE — PROGRESS NOTES
Pt presented for scheduled primary c/s. Pt states she had a phone interview with [address] [place] [address] RN and the information that was obtained has not changed. POC reviewed. IV started, Consents witnessed. Lab work drawn, sent to lab.

## 2019-12-17 NOTE — ANESTHESIA PREPROCEDURE EVALUATION
Anesthetic History   No history of anesthetic complications            Review of Systems / Medical History  Patient summary reviewed and pertinent labs reviewed    Pulmonary  Within defined limits                 Neuro/Psych   Within defined limits           Cardiovascular                  Exercise tolerance: >4 METS     GI/Hepatic/Renal     GERD: well controlled      Liver disease (cholestasis )     Endo/Other        Obesity    Comments: Thyroid nodule Other Findings   Comments: Fibromyalgia  Term preg., vulvodynia therfore elected to have cs.            Physical Exam    Airway  Mallampati: II  TM Distance: 4 - 6 cm  Neck ROM: normal range of motion   Mouth opening: Normal     Cardiovascular    Rhythm: regular           Dental  No notable dental hx       Pulmonary                 Abdominal         Other Findings            Anesthetic Plan    ASA: 2  Anesthesia type: spinal      Post-op pain plan if not by surgeon: intrathecal opiates      Anesthetic plan and risks discussed with: Patient and Spouse

## 2019-12-17 NOTE — L&D DELIVERY NOTE
Delivery Note    Obstetrician:  Janina Mcknight MD    Assistant: none    Pre-Delivery Diagnosis:  pregnancy <37 weeks and Pregnancy complicated by: AMA, gestational hypertension, and worsing Cholestasis of pregnancy    Post-Delivery Diagnosis: Living  infant(s), Female and named Melodie Velez    Intrapartum Event: None    Procedure: Primary Low Transverse  section    Complications:  none           Cord Blood Results:   Information for the patient's :  Figueroa Bo [[id number]]   No results found for: PCTABR, PCTDIG, BILI, ABORH    Prenatal Labs:     Lab Results   Component Value Date/Time    ABO/Rh(D) A POSITIVE 2019 06:06 AM    HBsAg, External Negative 2019    HIV, External Non-Reactive 2019    Rubella, External Non-Immune 2019    RPR, External Non-Reactive 2019        Attending Attestation: I was present and scrubbed for the entire procedure       see dictation [id number]  Delivery Summary    Patient: Arabella Montelongo MRN: [id number]  SSN: [id number]    YOB: 1980  Age: 37 y.o.   Sex: female        Information for the patient's :  Figueroa Bo [[id number]]       Labor Events:    Labor: No    Steroids: Full Course   Cervical Ripening Date/Time:       Cervical Ripening Type: None   Antibiotics During Labor:     Rupture Identifier: Sac 1    Rupture Date/Time: 2019 7:57 AM   Rupture Type: AROM   Amniotic Fluid Volume: Polyhydramic    Amniotic Fluid Description: Clear    Amniotic Fluid Odor: None    Induction: None       Induction Date/Time:        Indications for Induction:      Augmentation: None   Augmentation Date/Time:      Indications for Augmentation:     Labor complications: None       Additional complications:        Delivery Events:  Indications For Episiotomy:     Episiotomy: None   Perineal Laceration(s): None   Repaired:     Periurethral Laceration Location:      Repaired:     Labial Laceration Location:     Repaired:     Sulcal Laceration Location:     Repaired:     Vaginal Laceration Location:     Repaired:     Cervical Laceration Location:     Repaired:     Repair Suture:     Number of Repair Packets:     Estimated Blood Loss (ml): 700ml     Delivery Date: 2019    Delivery Time: 7:57 AM   Delivery Type: , Low Transverse     Details    Trial of Labor: No   Primary/Repeat: Primary   Priority: Routine   Indications: Other (Add Comments) Cholestasis     Sex:  Female     Gestational Age: 43w3d  Delivery Clinician:  Baltazar Franks  Living Status: Living   Delivery Location: OR            APGARS  One minute Five minutes Ten minutes   Skin color: 1   1        Heart rate: 2   2        Grimace: 2   2        Muscle tone: 2   2        Breathin   2        Totals: 9   9          Presentation: Vertex    Position:        Resuscitation Method:  Suctioning-bulb; Tactile Stimulation     Meconium Stained: None      Cord Information: 3 Vessels  Complications: None  Cord around:    Delayed cord clamping? Yes  Cord clamped date/time:2019  7:58 AM  Disposition of Cord Blood: Lab    Blood Gases Sent?: Yes    Placenta:  Date/Time: 2019  7:59 AM  Removal: Manual Removal      Appearance: Normal      Measurements:  Birth Weight: 2.59 kg      Birth Length: 46.5 cm      Head Circumference: 33 cm      Chest Circumference: 30.5 cm     Abdominal Girth:       Other Providers:   Lanning Cranker L;LEWIS ELIZABETH;JESSE HUDSON;ALEX ESTEBAN;LAURE BROWNING;CR ALVAREZ;SYDNEY LANE;ANDRIY SAL;MELLY GARNER, Obstetrician;Primary Nurse;Primary  Nurse;Neonatologist;Anesthesiologist;Crna;Scrub Tech;Scrub Tech;Respiratory Therapist             Group B Strep: No results found for: Hurshel Awe  Information for the patient's :   [[id number]]   No results found for: ABORH, PCTABR, PCTDIG, BILI, ABORHEXT, ABORH    Recent Labs     12/17/19  0810   PCO2CB 46   PO2CB 17   HCO3I 23.7   SO2I 21*   IBD 3   PTEMPI 98.6   SPECTI ARTERIAL CORD   PHICB 7.320   ISITE CORD   IDEV ROOM AIR   IALLEN NOT APPLICABLE

## 2019-12-18 LAB
ARTERIAL PATENCY WRIST A: ABNORMAL
BASE DEFICIT BLD-SCNC: 3 MMOL/L
BDY SITE: ABNORMAL
BODY TEMPERATURE: 98.6
CO2 BLD-SCNC: 24 MMOL/L
GAS FLOW.O2 O2 DELIVERY SYS: ABNORMAL L/MIN
HCO3 BLDV-SCNC: 22.5 MMOL/L (ref 23–28)
HCT VFR BLD AUTO: 26.5 % (ref 35.8–46.3)
HGB BLD-MCNC: 8.5 G/DL (ref 11.7–15.4)
PCO2 BLDCO: 39 MMHG (ref 32–68)
PH BLDCO: 7.36 [PH] (ref 7.15–7.38)
PO2 BLDCO: 28 MMHG
SAO2 % BLDV: 50 % (ref 65–88)
SERVICE CMNT-IMP: ABNORMAL
SPECIMEN TYPE: ABNORMAL

## 2019-12-18 PROCEDURE — 65270000029 HC RM PRIVATE

## 2019-12-18 PROCEDURE — 74011250637 HC RX REV CODE- 250/637: Performed by: ANESTHESIOLOGY

## 2019-12-18 PROCEDURE — 85018 HEMOGLOBIN: CPT

## 2019-12-18 PROCEDURE — 36415 COLL VENOUS BLD VENIPUNCTURE: CPT

## 2019-12-18 PROCEDURE — 74011250636 HC RX REV CODE- 250/636: Performed by: OBSTETRICS & GYNECOLOGY

## 2019-12-18 PROCEDURE — 74011250637 HC RX REV CODE- 250/637: Performed by: OBSTETRICS & GYNECOLOGY

## 2019-12-18 PROCEDURE — 74011250636 HC RX REV CODE- 250/636: Performed by: ANESTHESIOLOGY

## 2019-12-18 RX ORDER — LOPERAMIDE HYDROCHLORIDE 2 MG/1
4 CAPSULE ORAL
Status: DISCONTINUED | OUTPATIENT
Start: 2019-12-18 | End: 2019-12-20 | Stop reason: HOSPADM

## 2019-12-18 RX ORDER — OXYCODONE HYDROCHLORIDE 5 MG/1
10 TABLET ORAL
Status: DISCONTINUED | OUTPATIENT
Start: 2019-12-18 | End: 2019-12-20 | Stop reason: HOSPADM

## 2019-12-18 RX ORDER — KETOROLAC TROMETHAMINE 10 MG/1
10 TABLET, FILM COATED ORAL EVERY 6 HOURS
Status: DISCONTINUED | OUTPATIENT
Start: 2019-12-20 | End: 2019-12-19

## 2019-12-18 RX ORDER — HYDROCODONE BITARTRATE AND ACETAMINOPHEN 7.5; 325 MG/1; MG/1
1 TABLET ORAL
Status: DISCONTINUED | OUTPATIENT
Start: 2019-12-18 | End: 2019-12-18

## 2019-12-18 RX ORDER — HYDROCODONE BITARTRATE AND ACETAMINOPHEN 7.5; 325 MG/1; MG/1
2 TABLET ORAL
Status: DISCONTINUED | OUTPATIENT
Start: 2019-12-18 | End: 2019-12-18

## 2019-12-18 RX ORDER — OXYCODONE AND ACETAMINOPHEN 7.5; 325 MG/1; MG/1
1 TABLET ORAL
Status: DISCONTINUED | OUTPATIENT
Start: 2019-12-18 | End: 2019-12-18

## 2019-12-18 RX ORDER — OXYCODONE AND ACETAMINOPHEN 7.5; 325 MG/1; MG/1
2 TABLET ORAL
Status: DISCONTINUED | OUTPATIENT
Start: 2019-12-18 | End: 2019-12-20 | Stop reason: HOSPADM

## 2019-12-18 RX ORDER — HYDROMORPHONE HYDROCHLORIDE 1 MG/ML
1 INJECTION, SOLUTION INTRAMUSCULAR; INTRAVENOUS; SUBCUTANEOUS
Status: DISCONTINUED | OUTPATIENT
Start: 2019-12-18 | End: 2019-12-20 | Stop reason: HOSPADM

## 2019-12-18 RX ORDER — DOCUSATE SODIUM 100 MG/1
100 CAPSULE, LIQUID FILLED ORAL 2 TIMES DAILY
Status: DISCONTINUED | OUTPATIENT
Start: 2019-12-18 | End: 2019-12-20 | Stop reason: HOSPADM

## 2019-12-18 RX ORDER — OXYCODONE AND ACETAMINOPHEN 7.5; 325 MG/1; MG/1
2 TABLET ORAL ONCE
Status: COMPLETED | OUTPATIENT
Start: 2019-12-18 | End: 2019-12-18

## 2019-12-18 RX ORDER — SIMETHICONE 80 MG
80 TABLET,CHEWABLE ORAL
Status: DISCONTINUED | OUTPATIENT
Start: 2019-12-18 | End: 2019-12-20 | Stop reason: HOSPADM

## 2019-12-18 RX ORDER — OXYCODONE AND ACETAMINOPHEN 7.5; 325 MG/1; MG/1
1 TABLET ORAL
Status: DISCONTINUED | OUTPATIENT
Start: 2019-12-18 | End: 2019-12-20 | Stop reason: HOSPADM

## 2019-12-18 RX ORDER — KETOROLAC TROMETHAMINE 30 MG/ML
30 INJECTION, SOLUTION INTRAMUSCULAR; INTRAVENOUS EVERY 6 HOURS
Status: DISCONTINUED | OUTPATIENT
Start: 2019-12-18 | End: 2019-12-19

## 2019-12-18 RX ORDER — DIPHENHYDRAMINE HCL 25 MG
25 CAPSULE ORAL
Status: DISCONTINUED | OUTPATIENT
Start: 2019-12-18 | End: 2019-12-20 | Stop reason: HOSPADM

## 2019-12-18 RX ORDER — OXYCODONE AND ACETAMINOPHEN 7.5; 325 MG/1; MG/1
2 TABLET ORAL
Status: DISCONTINUED | OUTPATIENT
Start: 2019-12-18 | End: 2019-12-18

## 2019-12-18 RX ORDER — OXYCODONE HYDROCHLORIDE 5 MG/1
5 TABLET ORAL
Status: DISCONTINUED | OUTPATIENT
Start: 2019-12-18 | End: 2019-12-20 | Stop reason: HOSPADM

## 2019-12-18 RX ADMIN — OXYCODONE HYDROCHLORIDE AND ACETAMINOPHEN 2 TABLET: 7.5; 325 TABLET ORAL at 09:59

## 2019-12-18 RX ADMIN — Medication 10 ML: at 03:53

## 2019-12-18 RX ADMIN — OXYCODONE 10 MG: 5 TABLET ORAL at 00:08

## 2019-12-18 RX ADMIN — HYDROCODONE BITARTRATE AND ACETAMINOPHEN 2 TABLET: 7.5; 325 TABLET ORAL at 13:54

## 2019-12-18 RX ADMIN — HYDROMORPHONE HYDROCHLORIDE 1 MG: 1 INJECTION, SOLUTION INTRAMUSCULAR; INTRAVENOUS; SUBCUTANEOUS at 22:39

## 2019-12-18 RX ADMIN — KETOROLAC TROMETHAMINE 30 MG: 30 INJECTION, SOLUTION INTRAMUSCULAR at 16:29

## 2019-12-18 RX ADMIN — KETOROLAC TROMETHAMINE 30 MG: 30 INJECTION, SOLUTION INTRAMUSCULAR at 22:38

## 2019-12-18 RX ADMIN — SIMETHICONE CHEW TAB 80 MG 80 MG: 80 TABLET ORAL at 13:55

## 2019-12-18 RX ADMIN — SIMETHICONE CHEW TAB 80 MG 80 MG: 80 TABLET ORAL at 22:37

## 2019-12-18 RX ADMIN — DOCUSATE SODIUM 100 MG: 100 CAPSULE, LIQUID FILLED ORAL at 09:59

## 2019-12-18 RX ADMIN — DOCUSATE SODIUM 100 MG: 100 CAPSULE, LIQUID FILLED ORAL at 18:17

## 2019-12-18 RX ADMIN — SIMETHICONE CHEW TAB 80 MG 80 MG: 80 TABLET ORAL at 09:59

## 2019-12-18 RX ADMIN — KETOROLAC TROMETHAMINE 30 MG: 30 INJECTION, SOLUTION INTRAMUSCULAR at 03:53

## 2019-12-18 RX ADMIN — Medication 10 ML: at 22:00

## 2019-12-18 RX ADMIN — Medication 10 ML: at 16:32

## 2019-12-18 RX ADMIN — SIMETHICONE CHEW TAB 80 MG 80 MG: 80 TABLET ORAL at 18:17

## 2019-12-18 RX ADMIN — OXYCODONE HYDROCHLORIDE AND ACETAMINOPHEN 2 TABLET: 7.5; 325 TABLET ORAL at 18:17

## 2019-12-18 RX ADMIN — ACETAMINOPHEN 1000 MG: 500 TABLET, FILM COATED ORAL at 07:25

## 2019-12-18 RX ADMIN — HYDROMORPHONE HYDROCHLORIDE 1 MG: 1 INJECTION, SOLUTION INTRAMUSCULAR; INTRAVENOUS; SUBCUTANEOUS at 07:25

## 2019-12-18 RX ADMIN — FLUOXETINE 20 MG: 20 CAPSULE ORAL at 09:59

## 2019-12-18 RX ADMIN — KETOROLAC TROMETHAMINE 30 MG: 30 INJECTION, SOLUTION INTRAMUSCULAR at 09:59

## 2019-12-18 NOTE — PROGRESS NOTES
met with patient/ at bedside in the NICU. Baby \"Houston Anika\" was admitted to the NICU due to respiratory distress. Per parent, she may be able to return to mom's room tomorrow.  provided education and pamphlet on [facility] Postpartum  Home Visit Program.  Family was undecided on need for home visit. No referral will be made at this time. Family has this 's contact information should they decide to participate in program.       provided informational packet on  mood disorder education/resources. Patient with diagnosis of depression/bipolar. She is currently taking Prozac. Medications are managed by [facility]. Patient states that she's experienced some depression/anxiety during pregnancy due to being on bedrest and possible complications with baby. Per patient, she's \"looking forward to the future\" and feels emotionally better since baby's arrival.    Family receptive to receiving information and denied any additional needs from . Family has 's contact information should any needs/questions arise.     RAGHAV Heath-LEA  [address] [facility]   [phone]

## 2019-12-18 NOTE — PROGRESS NOTES
Discussed plan of care including frequent voids, ambulation in hallway, pain management and pain expectation, instructed to call out with any needs or requests

## 2019-12-18 NOTE — PROGRESS NOTES
Spoke with Dr. Amy Cintron on rounds regarding pain medication, discontinue percocet, start norco 7.5 mg po 1-2 tablets every 4 hours prn moderate pain, dilaudid may be used for breakthough pain, read back

## 2019-12-18 NOTE — PROGRESS NOTES
IV saline locked, SCDs and martinez catheter removed per protocol. Patient assisted out of bed and to the restroom. Patient educated on yves-care. Patient verbalized understanding of teaching. All questions answered. Patient assisted to the SCN via wheelchair.  accompanied patient.

## 2019-12-18 NOTE — PROGRESS NOTES
Notified Dr. Bridges S [facility] of patient request to try percocet order from this AM, states she wanted to take just percocet to see which narcotic worked better,  Percocet 7.5 mg po 2 tablets once at 1800, read back

## 2019-12-19 PROCEDURE — 65270000029 HC RM PRIVATE

## 2019-12-19 PROCEDURE — 74011250637 HC RX REV CODE- 250/637: Performed by: OBSTETRICS & GYNECOLOGY

## 2019-12-19 PROCEDURE — 74011250636 HC RX REV CODE- 250/636: Performed by: OBSTETRICS & GYNECOLOGY

## 2019-12-19 RX ORDER — POLYETHYLENE GLYCOL 3350 17 G/17G
17 POWDER, FOR SOLUTION ORAL DAILY PRN
Status: DISCONTINUED | OUTPATIENT
Start: 2019-12-19 | End: 2019-12-20 | Stop reason: HOSPADM

## 2019-12-19 RX ORDER — KETOROLAC TROMETHAMINE 10 MG/1
10 TABLET, FILM COATED ORAL EVERY 6 HOURS
Status: DISCONTINUED | OUTPATIENT
Start: 2019-12-19 | End: 2019-12-20 | Stop reason: HOSPADM

## 2019-12-19 RX ORDER — KETOROLAC TROMETHAMINE 10 MG/1
10 TABLET, FILM COATED ORAL EVERY 6 HOURS
Status: DISCONTINUED | OUTPATIENT
Start: 2019-12-20 | End: 2019-12-19

## 2019-12-19 RX ADMIN — FLUOXETINE 20 MG: 20 CAPSULE ORAL at 08:44

## 2019-12-19 RX ADMIN — OXYCODONE HYDROCHLORIDE AND ACETAMINOPHEN 2 TABLET: 7.5; 325 TABLET ORAL at 12:46

## 2019-12-19 RX ADMIN — OXYCODONE HYDROCHLORIDE AND ACETAMINOPHEN 2 TABLET: 7.5; 325 TABLET ORAL at 21:17

## 2019-12-19 RX ADMIN — SIMETHICONE CHEW TAB 80 MG 80 MG: 80 TABLET ORAL at 17:01

## 2019-12-19 RX ADMIN — OXYCODONE HYDROCHLORIDE AND ACETAMINOPHEN 2 TABLET: 7.5; 325 TABLET ORAL at 08:44

## 2019-12-19 RX ADMIN — SIMETHICONE CHEW TAB 80 MG 80 MG: 80 TABLET ORAL at 23:04

## 2019-12-19 RX ADMIN — SIMETHICONE CHEW TAB 80 MG 80 MG: 80 TABLET ORAL at 08:44

## 2019-12-19 RX ADMIN — OXYCODONE HYDROCHLORIDE AND ACETAMINOPHEN 2 TABLET: 7.5; 325 TABLET ORAL at 17:01

## 2019-12-19 RX ADMIN — KETOROLAC TROMETHAMINE 10 MG: 10 TABLET, FILM COATED ORAL at 17:00

## 2019-12-19 RX ADMIN — OXYCODONE HYDROCHLORIDE AND ACETAMINOPHEN 2 TABLET: 7.5; 325 TABLET ORAL at 02:29

## 2019-12-19 RX ADMIN — SIMETHICONE CHEW TAB 80 MG 80 MG: 80 TABLET ORAL at 12:46

## 2019-12-19 RX ADMIN — KETOROLAC TROMETHAMINE 10 MG: 10 TABLET, FILM COATED ORAL at 11:37

## 2019-12-19 RX ADMIN — POLYETHYLENE GLYCOL (3350) 17 G: 17 POWDER, FOR SOLUTION ORAL at 17:00

## 2019-12-19 RX ADMIN — KETOROLAC TROMETHAMINE 10 MG: 10 TABLET, FILM COATED ORAL at 23:01

## 2019-12-19 RX ADMIN — DOCUSATE SODIUM 100 MG: 100 CAPSULE, LIQUID FILLED ORAL at 17:01

## 2019-12-19 RX ADMIN — DOCUSATE SODIUM 100 MG: 100 CAPSULE, LIQUID FILLED ORAL at 08:44

## 2019-12-19 RX ADMIN — KETOROLAC TROMETHAMINE 30 MG: 30 INJECTION, SOLUTION INTRAMUSCULAR at 04:05

## 2019-12-19 NOTE — PROGRESS NOTES
SW follow-up with patient due to EPDS score (16). Discussed contributing factors that led to increased score (baby in NICU, need for c/s, bedrest/complications prior to delivery, history of depression, etc). Emotional support provided by .  affirmed patient's feelings. Patient on Prozac (managed at [facility]). Patient has worked with a therapist at this practice in the past, but has stopped treatment at this time. She feels comfortable with reinitiating therapy postpartum, if needed. Patient agreeable for  to call and check-in within the next few weeks. Additionally, patient has this 's contact information for any needs/concerns. OB notified of EPDS score (16) via fax.     MARV Yo  [facility]   [phone]

## 2019-12-19 NOTE — LACTATION NOTE
Mom has only pumped once. Reviewed supply and demand. Mom states she plans to pump again after she finishes her paperwork. Encouraged to call out for assistance as needed. Mom states baby may come to the room later today. Available for assistance as needed.

## 2019-12-19 NOTE — PROGRESS NOTES
Notified Dr. Herbert Sheehan that patient states percocet works better for pain control, percocet 7. 5 mg po 1-2 tablets every 4 hours prn, discontinue norco, read back

## 2019-12-19 NOTE — LACTATION NOTE
In to see mom for follow up. Mom has had pump at bedside all day, but has never started pumping yet. Full instruction given how to use and clean. Stayed w/ mom and showed how to use. She pumped 0.2mls of thin, yellow colostrum. Showed her how to draw up in syringe and label for dad to bring to [facility] today for baby. Mom states tried baby at breast once today. Reviewed importance of supply and demand in encouraging milk to come in. Reviewed hx of breast reduction and how that could possibly impact supply. Encouraged mom to pump 8x day for now. Try to latch baby as allowed. Lactation to follow up tomorrow.

## 2019-12-19 NOTE — PROGRESS NOTES
12/18/19 9931   Pain Assessment   Pain Scale 1 Numeric (0 - 10)   Pain Intensity 1 8   Pain Location 1 Abdomen; Incisional;Back   Pain Description 1 Aching;Cramping; Sore   Pain Intervention(s) 1 Medication (see MAR)     Scheduled Toradol 30mg/ 1 ml IV and PRN Dilaudid 1mg/IV administered at this time for pain

## 2019-12-19 NOTE — PROGRESS NOTES
Postpartum Progress Note (CS)    Patient: Julieth East MRN: [id number]  SSN: [id number]    YOB: 1980  Age: 37 y.o. Sex: female      Subjective:     Postpartum Day: 2     Delivery: Low transverse  delivery    The patient feels well. The patient denies emotional concerns. Pain is better controlled with current medications (PO Toradol and Percocet 7.5). Voiding spontaneous. The patient is ambulating well. The patient is tolerating a normal diet. Flatus has been passed. Itching from Cholestasis has resolved. The baby girl [facility]) is well, but still in the NICU. Baby is feeding via breast milk. Objective:      Patient Vitals for the past 8 hrs:   BP Temp Pulse Resp SpO2   19 0659 116/70 98.2 °F (36.8 °C) 90 17 96 %   19 0416 126/71 98 °F (36.7 °C) 91 17 95 %   19 0025 139/79 98.3 °F (36.8 °C) 97 22 96 %     General:    alert, cooperative, no distress   Bowel Sounds:  active   Lochia:  appropriate   Uterine Fundus:   firm   Fundus Location:  -2   Incision:  no significant drainage, no dehiscence, no significant erythema   DVT Evaluation:  No evidence of DVT seen on physical exam.     Lab/Data Review: All lab results for the last 24 hours reviewed. Assessment:     Status post: Doing well postpartum  delivery    - Delivery at 36w1d due to cholestasis of pregnancy    Plan:     - Postpartum care discussed including diet, ambulation, and actvitiy restrictions. - Encouraged continued activity, ambulation in the hospital  - Discharge planning for POD # 3.     Celsa Hernandes MD

## 2019-12-19 NOTE — PROGRESS NOTES
12/19/19 0229   Pain Assessment   Pain Scale 1 Numeric (0 - 10)   Pain Intensity 1 7   Pain Location 1 Abdomen; Incisional   Pain Description 1 Aching; Sore   Pain Intervention(s) 1 Medication (see MAR)     PRN Percocet 7.5/325 mg for pain 2 tabs

## 2019-12-20 VITALS
OXYGEN SATURATION: 97 % | DIASTOLIC BLOOD PRESSURE: 83 MMHG | SYSTOLIC BLOOD PRESSURE: 145 MMHG | TEMPERATURE: 98.6 F | RESPIRATION RATE: 18 BRPM | HEART RATE: 87 BPM

## 2019-12-20 PROCEDURE — 74011250637 HC RX REV CODE- 250/637: Performed by: OBSTETRICS & GYNECOLOGY

## 2019-12-20 RX ORDER — OXYCODONE AND ACETAMINOPHEN 7.5; 325 MG/1; MG/1
1-2 TABLET ORAL
Qty: 30 TAB | Refills: 0 | Status: SHIPPED | OUTPATIENT
Start: 2019-12-20 | End: 2020-01-03

## 2019-12-20 RX ORDER — IBUPROFEN 200 MG
600 TABLET ORAL
Qty: 30 TAB | Refills: 1 | Status: SHIPPED | OUTPATIENT
Start: 2019-12-20

## 2019-12-20 RX ADMIN — SIMETHICONE CHEW TAB 80 MG 80 MG: 80 TABLET ORAL at 08:19

## 2019-12-20 RX ADMIN — OXYCODONE HYDROCHLORIDE AND ACETAMINOPHEN 2 TABLET: 7.5; 325 TABLET ORAL at 13:10

## 2019-12-20 RX ADMIN — OXYCODONE HYDROCHLORIDE AND ACETAMINOPHEN 2 TABLET: 7.5; 325 TABLET ORAL at 01:33

## 2019-12-20 RX ADMIN — FLUOXETINE 20 MG: 20 CAPSULE ORAL at 08:19

## 2019-12-20 RX ADMIN — OXYCODONE HYDROCHLORIDE AND ACETAMINOPHEN 2 TABLET: 7.5; 325 TABLET ORAL at 06:14

## 2019-12-20 RX ADMIN — KETOROLAC TROMETHAMINE 10 MG: 10 TABLET, FILM COATED ORAL at 05:00

## 2019-12-20 RX ADMIN — KETOROLAC TROMETHAMINE 10 MG: 10 TABLET, FILM COATED ORAL at 12:14

## 2019-12-20 RX ADMIN — DOCUSATE SODIUM 100 MG: 100 CAPSULE, LIQUID FILLED ORAL at 08:19

## 2019-12-20 RX ADMIN — SIMETHICONE CHEW TAB 80 MG 80 MG: 80 TABLET ORAL at 12:14

## 2019-12-20 NOTE — PROGRESS NOTES
12/19/19 1346   Pain Assessment   Pain Scale 1 Numeric (0 - 10)   Pain Intensity 1 6   Pain Location 1 Abdomen; Incisional   Pain Description 1 Aching; Sore   Pain Intervention(s) 1 Medication (see MAR)     PRN Percocet 7.5/325 mg 2 tabs for pain

## 2019-12-20 NOTE — PROGRESS NOTES
12/20/19 0614   Pain Assessment   Pain Scale 1 Numeric (0 - 10)   Pain Intensity 1 7   Pain Location 1 Abdomen; Incisional   Pain Description 1 Aching; Sore   Pain Intervention(s) 1 Medication (see MAR)     PRN Percocet 7.5/325mg for pain

## 2019-12-20 NOTE — PROGRESS NOTES
12/20/19 0133   Pain Assessment   Pain Scale 1 Numeric (0 - 10)   Pain Intensity 1 6   Pain Location 1 Abdomen; Incisional   Pain Description 1 Aching; Sore   Pain Intervention(s) 1 Medication (see MAR)     PRN Percocet 7.5/325mg 2 tabs for pain

## 2019-12-20 NOTE — PROGRESS NOTES
Pt had c/o pain in abd after an assessment in the last 2 days, much concern over the fact that the area burned when it was pressed on. Dr. Gilmar Amor was called and relayed this info. , pt to continue with d/c home as planned with the pain regime discussed and to add a heating pad if needed. Pt to call the office if the pain worsens and pain meds do not help.

## 2019-12-20 NOTE — DISCHARGE SUMMARY
Obstetrical Discharge Summary     Name: Marylene Bos MRN: [id number]  SSN: [id number]    YOB: 1980  Age: 37 y.o. Sex: female      Allergies: Latex; Augmentin [amoxicillin-pot clavulanate]; Cymbalta [duloxetine]; Hydroxyzine hcl; Other medication; Other plant, animal, environmental; Zithromax [azithromycin]; and Zyrtec [cetirizine]    Admit Date: 2019    Discharge Date: 2019     Admitting Physician: Hernando Ricci MD     Attending Physician:  Lucy Richey MD     * Admission Diagnoses: Cholestasis during pregnancy in third trimester [O26.613, K83.1]  36 weeks gestation of pregnancy [Z3A.36]    * Discharge Diagnoses:   Information for the patient's :  Jasmine Rush [[id number]]   Delivery of a 2.59 kg female infant via , Low Transverse on 2019 at 7:57 AM  by Hernando Ricci. Apgars were 9  and 9 . Additional Diagnoses:   Hospital Problems as of 2019 Date Reviewed: 2019          Codes Class Noted - Resolved POA    36 weeks gestation of pregnancy ICD-10-CM: Z3A.36  ICD-9-CM: V22.2  2019 - Present Unknown        * (Principal) Cholestasis during pregnancy in third trimester ICD-10-CM: O26.613, K83.1  ICD-9-CM: 646.73, 576.8  2019 - Present Unknown        Gestational hypertension without significant proteinuria in third trimester ICD-10-CM: O13.3  ICD-9-CM: 642.33  2019 - Present Unknown             Lab Results   Component Value Date/Time    ABO/Rh(D) A POSITIVE 2019 06:06 AM    Rubella, External Non-Immune 2019    There is no immunization history for the selected administration types on file for this patient.     * Procedures:   Procedure(s) with comments:   SECTION - marcia 2019 Primary  Section- cholestasis of pregnancy      [place]  Depression Scale  I have been able to laugh and see the funny side of things: As much as I always could  I have looked forward with enjoyment to things: Definitely less than I used to  I have blamed myself unnecessarily when things went wrong: Yes, some of the time  I have been anxious or worried for no good reason: Yes, sometimes  I have felt scared or panicky for no very good reason: No, not at all  Things have been getting on top of me: Yes, sometimes I haven't been coping as well as usual  I have been so unhappy that I have had difficulty sleeping: Yes, most of the time  I have felt sad or miserable: Yes, quite often  I have been so unhappy that I have been crying: Yes, most of the time  The thought of harming myself has occurred to me: Never  Total Score: 16    * Discharge Condition: good and stable    * Hospital Course: Normal hospital course following the delivery. * Disposition: Home    Discharge Medications:   Current Discharge Medication List      START taking these medications    Details   oxyCODONE-acetaminophen (PERCOCET 7.5) 7.5-325 mg per tablet Take 1-2 Tabs by mouth every six (6) hours as needed for Pain for up to 14 days. Max Daily Amount: 8 Tabs. Qty: 30 Tab, Refills: 0    Associated Diagnoses: Delivery of pregnancy by  section      ibuprofen (MOTRIN) 200 mg tablet Take 3 Tabs by mouth every six (6) hours as needed for Pain. Qty: 30 Tab, Refills: 1             * Follow-up Care/Patient Instructions: Activity: Activity as tolerated  Diet: Regular Diet  Wound Care: As directed    Follow-up Information     Follow up With Specialties Details Why Contact Info    New Cintron MD Internal Medicine   [address] [facility] [address] [facility]  [phone]                                                    Post-Operative Day Number 3 Progress/Discharge Note    Patient doing well post-op day 3 from  delivery without significant complaints. Pain controlled on current medication. Voiding without difficulty, normal lochia.     Vitals:    Patient Vitals for the past 8 hrs:   BP Temp Pulse Resp SpO2   19 0718 143/84 98.4 °F (36.9 °C) 91 18 97 %   19 0517 142/72  98     19 0032 159/81 98.3 °F (36.8 °C) 99 18 97 %     Temp (24hrs), Av.3 °F (36.8 °C), Min:98 °F (36.7 °C), Max:98.4 °F (36.9 °C)      Vital signs stable, afebrile. Exam:  Patient without distress. Abdomen soft, fundus firm at level of umbilicus, non tender. Incision dry and                      clean without erythema. Lower extremities are negative for swelling, cords or tenderness. Lab/Data Review: All lab results for the last 24 hours reviewed. Assessment and Plan:  Patient appears to be having uncomplicated post- course. Continue routine post-op care and maternal education. Plan discharge for today with follow up in our office in 1-2 weeks.

## 2019-12-20 NOTE — LACTATION NOTE
This note was copied from a baby's chart. Mom is mostly bottle feeding and occasionally pumping. Reviewed supply and demand. Gave info to get a pump through insurance. Rental complete. Mom reports feedings going well. No problems or questions. Encouraged frequent feeding. Watch output. Call as needed.

## 2019-12-20 NOTE — DISCHARGE INSTRUCTIONS
Patient Education   Patient Education        After Your Delivery (the Postpartum Period): Care Instructions  Your Care Instructions    Congratulations on the birth of your baby. Like pregnancy, the  period can be a time of excitement, alise, and exhaustion. You may look at your wondrous little baby and feel happy. You may also be overwhelmed by your new sleep hours and new responsibilities. At first, babies often sleep during the days and are awake at night. They do not have a pattern or routine. They may make sudden gasps, jerk themselves awake, or look like they have crossed eyes. These are all normal, and they may even make you smile. In these first weeks after delivery, try to take good care of yourself. It may take 4 to 6 weeks to feel like yourself again, and possibly longer if you had a  birth. You will likely feel very tired for several weeks. Your days will be full of ups and downs, but lots of alise as well. Follow-up care is a key part of your treatment and safety. Be sure to make and go to all appointments, and call your doctor if you are having problems. It's also a good idea to know your test results and keep a list of the medicines you take. How can you care for yourself at home? Take care of your body after delivery  · Use pads instead of tampons for the bloody flow that may last as long as 2 weeks. · Ease cramps with ibuprofen (Advil, Motrin). · Ease soreness of hemorrhoids and the area between your vagina and rectum with ice compresses or witch hazel pads. · Ease constipation by drinking lots of fluid and eating high-fiber foods. Ask your doctor about over-the-counter stool softeners. · Cleanse yourself with a gentle squeeze of warm water from a bottle instead of wiping with toilet paper. · Take a sitz bath in warm water several times a day. · Wear a good nursing bra. Ease sore and swollen breasts with warm, wet washcloths.   · If you are not breastfeeding, use ice rather than heat for breast soreness. · Your period may not start for several months if you are breastfeeding. You may bleed more, and longer at first, than you did before you got pregnant. · Wait until you are healed (about 4 to 6 weeks) before you have sexual intercourse. Your doctor will tell you when it is okay to have sex. · Try not to travel with your baby for 5 or 6 weeks. If you take a long car trip, make frequent stops to walk around and stretch. Avoid exhaustion  · Rest every day. Try to nap when your baby naps. · Ask another adult to be with you for a few days after delivery. · Plan for  if you have other children. · Stay flexible so you can eat at odd hours and sleep when you need to. Both you and your baby are making new schedules. · Plan small trips to get out of the house. Change can make you feel less tired. · Ask for help with housework, cooking, and shopping. Remind yourself that your job is to care for your baby. Know about help for postpartum depression  · \"Baby blues\" are common for the first 1 to 2 weeks after birth. You may cry or feel sad or irritable for no reason. · Rest whenever you can. Being tired makes it harder to handle your emotions. · Go for walks with your baby. · Talk to your partner, friends, and family about your feelings. · If your symptoms last for more than a few weeks, or if you feel very depressed, ask your doctor for help. · Postpartum depression can be treated. Support groups and counseling can help. Sometimes medicine can also help. Stay healthy  · Eat healthy foods so you have more energy and lose extra baby pounds. · If you breastfeed, avoid drugs. If you quit smoking during pregnancy, try to stay smoke-free. If you choose to have a drink now and then, have only one drink, and limit the number of occasions that you have a drink.  Wait to breastfeed at least 2 hours after you have a drink to reduce the amount of alcohol the baby may get in the milk.  · Start daily exercise after 4 to 6 weeks, but rest when you feel tired. · Learn exercises to tone your belly. Do Kegel exercises to regain strength in your pelvic muscles. You can do these exercises while you stand or sit. ? Squeeze the same muscles you would use to stop your urine. Your belly and thighs should not move. ? Hold the squeeze for 3 seconds, and then relax for 3 seconds. ? Start with 3 seconds. Then add 1 second each week until you are able to squeeze for 10 seconds. ? Repeat the exercise 10 to 15 times for each session. Do three or more sessions each day. · Find a class for new mothers and new babies that has an exercise time. · If you had a  birth, give yourself a bit more time before you exercise, and be careful. When should you call for help? Call 911 anytime you think you may need emergency care. For example, call if:    · You have thoughts of harming yourself, your baby, or another person.     · You passed out (lost consciousness).     · You have chest pain, are short of breath, or cough up blood.     · You have a seizure.    Call your doctor now or seek immediate medical care if:    · You have severe vaginal bleeding. This means you are passing blood clots and soaking through a pad each hour for 2 or more hours.     · You are dizzy or lightheaded, or you feel like you may faint.     · You have a fever.     · You have new or more belly pain.     · You have signs of a blood clot in your leg (called a deep vein thrombosis), such as:  ? Pain in the calf, back of the knee, thigh, or groin. ? Redness and swelling in your leg or groin.     · You have signs of preeclampsia, such as:  ? Sudden swelling of your face, hands, or feet. ? New vision problems (such as dimness, blurring, or seeing spots).   ? A severe headache.    Watch closely for changes in your health, and be sure to contact your doctor if:    · Your vaginal bleeding seems to be getting heavier.     · You have new or worse vaginal discharge.     · You feel sad, anxious, or hopeless for more than a few days.     · You do not get better as expected. Where can you learn more? Go to http://austin-jose.info/. Enter A461 in the search box to learn more about \"After Your Delivery (the Postpartum Period): Care Instructions. \"  Current as of: May 29, 2019  Content Version: 12.2  © 7651-2316 [employer]. Care instructions adapted under license by [employer] (which disclaims liability or warranty for this information). If you have questions about a medical condition or this instruction, always ask your healthcare professional. [place] [address] any warranty or liability for your use of this information.  Section: What to Expect at [address] [place] [address]    A  section, or , is surgery to deliver your baby through a cut, called an incision, that the doctor makes in your lower belly and uterus. You may have some pain in your lower belly and need pain medicine for 1 to 2 weeks. You can expect some vaginal bleeding for several weeks. You will probably need about 6 weeks to fully recover. It is important to take it easy while the incision is healing. Avoid heavy lifting, strenuous activities, or exercises that strain the belly muscles while you are recovering. Ask a family member or friend for help with housework, cooking, and shopping. This care sheet gives you a general idea about how long it will take for you to recover. But each person recovers at a different pace. Follow the steps below to get better as quickly as possible. How can you care for yourself at home? Activity    · Rest when you feel tired. Getting enough sleep will help you recover.     · Try to walk each day. Start by walking a little more than you did the day before. Bit by bit, increase the amount you walk.  Walking boosts blood flow and helps prevent pneumonia, constipation, and blood clots.     · Avoid strenuous activities, such as bicycle riding, jogging, weightlifting, and aerobic exercise, for 6 weeks or until your doctor says it is okay.     · Until your doctor says it is okay, do not lift anything heavier than your baby.     · Do not do sit-ups or other exercises that strain the belly muscles for 6 weeks or until your doctor says it is okay.     · Hold a pillow over your incision when you cough or take deep breaths. This will support your belly and decrease your pain.     · You may shower as usual. Pat the incision dry when you are done.     · You will have some vaginal bleeding. Wear sanitary pads. Do not douche or use tampons until your doctor says it is okay.     · Ask your doctor when you can drive again.     · You will probably need to take at least 6 weeks off work. It depends on the type of work you do and how you feel.     · Ask your doctor when it is okay for you to have sex. Diet    · You can eat your normal diet. If your stomach is upset, try bland, low-fat foods like plain rice, broiled chicken, toast, and yogurt.     · Drink plenty of fluids (unless your doctor tells you not to).     · You may notice that your bowel movements are not regular right after your surgery. This is common. Try to avoid constipation and straining with bowel movements. You may want to take a fiber supplement every day. If you have not had a bowel movement after a couple of days, ask your doctor about taking a mild laxative.     · If you are breastfeeding, limit alcohol. Alcohol can cause a lack of energy and other health problems for the baby when a breastfeeding woman drinks heavily. It can also get in the way of a mom's ability to feed her baby or to care for the child in other ways. There isn't a lot of research about exactly how much alcohol can harm a baby. Having no alcohol is the safest choice for your baby.  If you choose to have a drink now and then, have only one drink, and limit the number of occasions that you have a drink. Wait to breastfeed at least 2 hours after you have a drink to reduce the amount of alcohol the baby may get in the milk. Medicines    · Your doctor will tell you if and when you can restart your medicines. He or she will also give you instructions about taking any new medicines.     · If you take blood thinners, such as warfarin (Coumadin), clopidogrel (Plavix), or aspirin, be sure to talk to your doctor. He or she will tell you if and when to start taking those medicines again. Make sure that you understand exactly what your doctor wants you to do.     · Take pain medicines exactly as directed. ? If the doctor gave you a prescription medicine for pain, take it as prescribed. ? If you are not taking a prescription pain medicine, ask your doctor if you can take an over-the-counter medicine.     · If you think your pain medicine is making you sick to your stomach:  ? Take your medicine after meals (unless your doctor has told you not to). ? Ask your doctor for a different pain medicine.     · If your doctor prescribed antibiotics, take them as directed. Do not stop taking them just because you feel better. You need to take the full course of antibiotics. Incision care    · If you have strips of tape on the incision, leave the tape on for a week or until it falls off.     · Wash the area daily with warm, soapy water, and pat it dry. Don't use hydrogen peroxide or alcohol, which can slow healing. You may cover the area with a gauze bandage if it weeps or rubs against clothing. Change the bandage every day.     · Keep the area clean and dry. Other instructions    · If you breastfeed your baby, you may be more comfortable while you are healing if you place the baby so that he or she is not resting on your belly. Try tucking your baby under your arm, with his or her body along the side you will be feeding on. Support your baby's upper body with your arm.  With that hand you can control your baby's head to bring his or her mouth to your breast. This is sometimes called the football hold. Follow-up care is a key part of your treatment and safety. Be sure to make and go to all appointments, and call your doctor if you are having problems. It's also a good idea to know your test results and keep a list of the medicines you take. When should you call for help? Call 911 anytime you think you may need emergency care. For example, call if:    · You have thoughts of harming yourself, your baby, or another person.     · You passed out (lost consciousness).     · You have chest pain, are short of breath, or cough up blood.     · You have a seizure.    Call your doctor now or seek immediate medical care if:    · You have pain that does not get better after you take pain medicine.     · You have severe vaginal bleeding.     · You are dizzy or lightheaded, or you feel like you may faint.     · You have new or worse pain in your belly or pelvis.     · You have loose stitches, or your incision comes open.     · You have symptoms of infection, such as:  ? Increased pain, swelling, warmth, or redness. ? Red streaks leading from the incision. ? Pus draining from the incision. ? A fever.     · You have symptoms of a blood clot in your leg (called a deep vein thrombosis), such as:  ? Pain in your calf, back of the knee, thigh, or groin. ? Redness and swelling in your leg or groin.     · You have signs of preeclampsia, such as:  ? Sudden swelling of your face, hands, or feet. ? New vision problems (such as dimness, blurring, or seeing spots). ? A severe headache.    Watch closely for changes in your health, and be sure to contact your doctor if:    · You do not get better as expected. Where can you learn more? Go to http://austin-jose.info/. Enter M806 in the search box to learn more about \" Section: What to Expect at Home. \"  Current as of: May 29, 2019  Content Version: 12.2  © 7289-4560 [employer]. Care instructions adapted under license by [employer] (which disclaims liability or warranty for this information). If you have questions about a medical condition or this instruction, always ask your healthcare professional. [address] any warranty or liability for your use of this information.

## 2020-01-03 ENCOUNTER — TELEPHONE (OUTPATIENT)
Dept: CASE MANAGEMENT | Age: 40
End: 2020-01-03

## 2020-01-03 NOTE — TELEPHONE ENCOUNTER
Phone call to patient to check-in due to EPDS score of 16 after delivery. No answer; message left.     MARV Bell  [facility]   [phone]

## 2020-01-31 ENCOUNTER — TELEPHONE (OUTPATIENT)
Dept: CASE MANAGEMENT | Age: 40
End: 2020-01-31

## 2020-01-31 NOTE — TELEPHONE ENCOUNTER
Phone call to patient to check-in due to EPDS score of 16 after delivery.     No answer; message left.     Phillip Mendieta [address]   [phone]

## 2020-02-18 ENCOUNTER — TELEPHONE (OUTPATIENT)
Dept: CASE MANAGEMENT | Age: 40
End: 2020-02-18

## 2020-02-18 NOTE — TELEPHONE ENCOUNTER
Phone call to patient to check-in due to EPDS score of 16 after delivery.     No answer; message left.     Phillip Cornelius [address]   [phone]

## 2022-03-18 PROBLEM — O26.643 CHOLESTASIS DURING PREGNANCY IN THIRD TRIMESTER: Status: ACTIVE | Noted: 2019-12-17

## 2022-03-19 PROBLEM — L29.9 PRURITUS OF PREGNANCY IN THIRD TRIMESTER: Status: ACTIVE | Noted: 2019-09-16

## 2022-03-19 PROBLEM — O35.9XX0 SUSPECTED FETAL ANOMALY, ANTEPARTUM: Status: ACTIVE | Noted: 2019-10-09

## 2022-03-19 PROBLEM — Z87.42 HISTORY OF VULVODYNIA: Status: ACTIVE | Noted: 2019-11-05

## 2022-03-19 PROBLEM — O99.340 DEPRESSION AFFECTING PREGNANCY: Status: ACTIVE | Noted: 2019-08-14

## 2022-03-19 PROBLEM — F32.A DEPRESSION AFFECTING PREGNANCY: Status: ACTIVE | Noted: 2019-08-14

## 2022-03-19 PROBLEM — O99.713 PRURITUS OF PREGNANCY IN THIRD TRIMESTER: Status: ACTIVE | Noted: 2019-09-16

## 2022-03-19 PROBLEM — O09.523 MULTIGRAVIDA OF ADVANCED MATERNAL AGE IN THIRD TRIMESTER: Status: ACTIVE | Noted: 2019-08-14

## 2022-03-20 PROBLEM — R51.9 HEADACHE IN PREGNANCY, THIRD TRIMESTER: Status: ACTIVE | Noted: 2019-09-16

## 2022-03-20 PROBLEM — R10.2 PELVIC PAIN AFFECTING PREGNANCY IN THIRD TRIMESTER, ANTEPARTUM: Status: ACTIVE | Noted: 2019-12-13

## 2022-03-20 PROBLEM — O26.893 PELVIC PAIN AFFECTING PREGNANCY IN THIRD TRIMESTER, ANTEPARTUM: Status: ACTIVE | Noted: 2019-12-13

## 2022-03-20 PROBLEM — O36.8130 DECREASED FETAL MOVEMENT AFFECTING MANAGEMENT OF PREGNANCY IN THIRD TRIMESTER: Status: ACTIVE | Noted: 2019-12-15

## 2022-03-20 PROBLEM — O13.3 GESTATIONAL HYPERTENSION WITHOUT SIGNIFICANT PROTEINURIA IN THIRD TRIMESTER: Status: ACTIVE | Noted: 2019-12-17

## 2022-03-20 PROBLEM — Z3A.36 36 WEEKS GESTATION OF PREGNANCY: Status: ACTIVE | Noted: 2019-12-17

## 2022-03-20 PROBLEM — O40.3XX0 POLYHYDRAMNIOS IN THIRD TRIMESTER: Status: ACTIVE | Noted: 2019-11-05

## 2022-03-20 PROBLEM — O26.893 HEADACHE IN PREGNANCY, THIRD TRIMESTER: Status: ACTIVE | Noted: 2019-09-16

## 2023-12-24 NOTE — PROGRESS NOTES
12/20/19 0505   Pain Assessment   Pain Scale 1 Numeric (0 - 10)   Pain Intensity 1 4   Pain Location 1 Abdomen; Incisional   Pain Description 1 Aching; Sore   Pain Intervention(s) 1 Medication (see MAR)     Scheduled Toradol 10mg PO for pain spontaneous

## 2025-05-12 ENCOUNTER — OFFICE VISIT (OUTPATIENT)
Dept: PRIMARY CARE CLINIC | Facility: CLINIC | Age: 45
End: 2025-05-12
Payer: COMMERCIAL

## 2025-05-12 VITALS
HEIGHT: 64 IN | BODY MASS INDEX: 26.12 KG/M2 | DIASTOLIC BLOOD PRESSURE: 74 MMHG | OXYGEN SATURATION: 99 % | SYSTOLIC BLOOD PRESSURE: 106 MMHG | HEART RATE: 69 BPM | TEMPERATURE: 98.1 F | WEIGHT: 153 LBS

## 2025-05-12 DIAGNOSIS — F33.2 SEVERE EPISODE OF RECURRENT MAJOR DEPRESSIVE DISORDER, WITHOUT PSYCHOTIC FEATURES (HCC): Chronic | ICD-10-CM

## 2025-05-12 DIAGNOSIS — Z13.21 ENCOUNTER FOR VITAMIN DEFICIENCY SCREENING: ICD-10-CM

## 2025-05-12 DIAGNOSIS — K52.9 CHRONIC DIARRHEA: Primary | ICD-10-CM

## 2025-05-12 DIAGNOSIS — Z13.1 SCREENING FOR DIABETES MELLITUS: ICD-10-CM

## 2025-05-12 DIAGNOSIS — K44.9 HIATAL HERNIA: ICD-10-CM

## 2025-05-12 DIAGNOSIS — F41.1 GAD (GENERALIZED ANXIETY DISORDER): Chronic | ICD-10-CM

## 2025-05-12 DIAGNOSIS — Z13.220 SCREENING FOR LIPID DISORDERS: ICD-10-CM

## 2025-05-12 DIAGNOSIS — R11.14 BILIOUS VOMITING WITH NAUSEA: ICD-10-CM

## 2025-05-12 DIAGNOSIS — F43.10 PTSD (POST-TRAUMATIC STRESS DISORDER): Chronic | ICD-10-CM

## 2025-05-12 DIAGNOSIS — K21.9 GASTROESOPHAGEAL REFLUX DISEASE WITHOUT ESOPHAGITIS: ICD-10-CM

## 2025-05-12 DIAGNOSIS — Z13.29 SCREENING FOR THYROID DISORDER: ICD-10-CM

## 2025-05-12 PROBLEM — F40.01 PANIC DISORDER WITH AGORAPHOBIA: Status: ACTIVE | Noted: 2017-07-22

## 2025-05-12 PROBLEM — E66.811 OBESITY (BMI 30.0-34.9): Status: ACTIVE | Noted: 2023-11-19

## 2025-05-12 PROBLEM — R20.0 NUMBNESS AND TINGLING IN RIGHT HAND: Status: ACTIVE | Noted: 2017-03-28

## 2025-05-12 PROBLEM — M25.50 CHRONIC JOINT PAIN: Status: ACTIVE | Noted: 2017-01-31

## 2025-05-12 PROBLEM — M54.12 CERVICAL RADICULOPATHY: Status: ACTIVE | Noted: 2021-09-06

## 2025-05-12 PROBLEM — H69.90 EUSTACHIAN TUBE DYSFUNCTION: Status: ACTIVE | Noted: 2025-05-12

## 2025-05-12 PROBLEM — G89.29 CHRONIC JOINT PAIN: Status: ACTIVE | Noted: 2017-01-31

## 2025-05-12 PROBLEM — R20.2 NUMBNESS AND TINGLING IN RIGHT HAND: Status: ACTIVE | Noted: 2017-03-28

## 2025-05-12 PROCEDURE — 99204 OFFICE O/P NEW MOD 45 MIN: CPT

## 2025-05-12 RX ORDER — ONDANSETRON 4 MG/1
4 TABLET, ORALLY DISINTEGRATING ORAL EVERY 6 HOURS PRN
Qty: 30 TABLET | Refills: 0 | Status: SHIPPED | OUTPATIENT
Start: 2025-05-12

## 2025-05-12 RX ORDER — LOPERAMIDE HYDROCHLORIDE 2 MG/1
2 CAPSULE ORAL EVERY 6 HOURS PRN
Qty: 30 CAPSULE | Refills: 0 | Status: SHIPPED | OUTPATIENT
Start: 2025-05-12

## 2025-05-12 RX ORDER — OMEPRAZOLE 20 MG/1
20 CAPSULE, DELAYED RELEASE ORAL DAILY
COMMUNITY
Start: 2020-01-01 | End: 2025-11-19

## 2025-05-12 RX ORDER — CLONAZEPAM 2 MG/1
2 TABLET ORAL 2 TIMES DAILY PRN
COMMUNITY
Start: 2015-01-01

## 2025-05-12 RX ORDER — PROPRANOLOL HYDROCHLORIDE 10 MG/1
10 TABLET ORAL PRN
COMMUNITY

## 2025-05-12 RX ORDER — POLYETHYLENE GLYCOL 3350 17 G/17G
17 POWDER, FOR SOLUTION ORAL DAILY
COMMUNITY
Start: 2024-11-19 | End: 2025-11-19

## 2025-05-12 SDOH — ECONOMIC STABILITY: FOOD INSECURITY: WITHIN THE PAST 12 MONTHS, THE FOOD YOU BOUGHT JUST DIDN'T LAST AND YOU DIDN'T HAVE MONEY TO GET MORE.: NEVER TRUE

## 2025-05-12 SDOH — HEALTH STABILITY: PHYSICAL HEALTH: ON AVERAGE, HOW MANY DAYS PER WEEK DO YOU ENGAGE IN MODERATE TO STRENUOUS EXERCISE (LIKE A BRISK WALK)?: 0 DAYS

## 2025-05-12 SDOH — HEALTH STABILITY: PHYSICAL HEALTH: ON AVERAGE, HOW MANY MINUTES DO YOU ENGAGE IN EXERCISE AT THIS LEVEL?: 0 MIN

## 2025-05-12 SDOH — ECONOMIC STABILITY: FOOD INSECURITY: WITHIN THE PAST 12 MONTHS, YOU WORRIED THAT YOUR FOOD WOULD RUN OUT BEFORE YOU GOT MONEY TO BUY MORE.: NEVER TRUE

## 2025-05-12 ASSESSMENT — PATIENT HEALTH QUESTIONNAIRE - PHQ9
8. MOVING OR SPEAKING SO SLOWLY THAT OTHER PEOPLE COULD HAVE NOTICED. OR THE OPPOSITE, BEING SO FIGETY OR RESTLESS THAT YOU HAVE BEEN MOVING AROUND A LOT MORE THAN USUAL: NOT AT ALL
SUM OF ALL RESPONSES TO PHQ QUESTIONS 1-9: 12
6. FEELING BAD ABOUT YOURSELF - OR THAT YOU ARE A FAILURE OR HAVE LET YOURSELF OR YOUR FAMILY DOWN: NOT AT ALL
SUM OF ALL RESPONSES TO PHQ QUESTIONS 1-9: 12
4. FEELING TIRED OR HAVING LITTLE ENERGY: NEARLY EVERY DAY
9. THOUGHTS THAT YOU WOULD BE BETTER OFF DEAD, OR OF HURTING YOURSELF: NOT AT ALL
SUM OF ALL RESPONSES TO PHQ QUESTIONS 1-9: 12
SUM OF ALL RESPONSES TO PHQ QUESTIONS 1-9: 12
10. IF YOU CHECKED OFF ANY PROBLEMS, HOW DIFFICULT HAVE THESE PROBLEMS MADE IT FOR YOU TO DO YOUR WORK, TAKE CARE OF THINGS AT HOME, OR GET ALONG WITH OTHER PEOPLE: EXTREMELY DIFFICULT
7. TROUBLE CONCENTRATING ON THINGS, SUCH AS READING THE NEWSPAPER OR WATCHING TELEVISION: NEARLY EVERY DAY
5. POOR APPETITE OR OVEREATING: NEARLY EVERY DAY
1. LITTLE INTEREST OR PLEASURE IN DOING THINGS: NOT AT ALL
2. FEELING DOWN, DEPRESSED OR HOPELESS: NOT AT ALL
3. TROUBLE FALLING OR STAYING ASLEEP: NEARLY EVERY DAY

## 2025-05-12 ASSESSMENT — ENCOUNTER SYMPTOMS
NAUSEA: 1
VOMITING: 1
DIARRHEA: 1

## 2025-05-12 NOTE — PROGRESS NOTES
[facility] [address]             [address] [place] [address]             Tel:[phone]      Mary Medellin 1980 is a 44 y.o. female New patient, here for evaluation of the following:     New to the practice here with her DAD .  Patient c/o of diarrhea ,nausea ,vomiting for 2 months .  She had the flu around that time .  She doesn't have a PCP was seeing urgent care/primary care this past months for this issue ,no improvement . She took antibiotic for BV in march .  She denies blood,mucus ,smell in stool ,  Has not been able to eat much or drink severe nausea ,on and off vomiting has been bilious no blood  . Has loose weigh not able to eat much, hx of gallbladder removed in 2023 but this is the first time this has occurred. Has not been around anybody stomach virus .    Pmhx   ADHD/depression/anxiety : [facility] .  Hx of facial nerve pain /fibromyalgia sees Neurologist and has seen pain management in the past.       Chief Complaint   Patient presents with    New Patient    Establish Care     Nausea, vomiting, diarrhea x2 months        HPI      Allergies   Allergen Reactions    Latex Rash    Azithromycin Swelling    Betamethasone Valerate     Cetirizine Swelling    Duloxetine Other (See Comments)     Sloughing inside of mouth    Duloxetine Hcl Other (See Comments)     Per pt oral sloughing    Hydroxyzine Hcl Other (See Comments)     headaches    Pimecrolimus      Past Medical History:   Diagnosis Date    Abnormal Pap smear 2000    with laser ablation     Abnormal Papanicolaou smear of cervix     ADHD (attention deficit hyperactivity disorder)     Allergic rhinitis 09/26/2016    Overactive ,sensitivities    Anxiety     Autoimmune disease     fibromyalgia    Bacterial infection     Carrier of ureaplasma urealyticum     Cholestasis during pregnancy in third trimester 12/17/2019    Chronic back pain     Chronic

## 2025-05-15 DIAGNOSIS — Z13.29 SCREENING FOR THYROID DISORDER: ICD-10-CM

## 2025-05-15 DIAGNOSIS — Z13.220 SCREENING FOR LIPID DISORDERS: ICD-10-CM

## 2025-05-15 DIAGNOSIS — Z13.21 ENCOUNTER FOR VITAMIN DEFICIENCY SCREENING: ICD-10-CM

## 2025-05-15 DIAGNOSIS — K52.9 CHRONIC DIARRHEA: ICD-10-CM

## 2025-05-15 DIAGNOSIS — R11.14 BILIOUS VOMITING WITH NAUSEA: ICD-10-CM

## 2025-05-15 DIAGNOSIS — K44.9 HIATAL HERNIA: ICD-10-CM

## 2025-05-15 DIAGNOSIS — Z13.1 SCREENING FOR DIABETES MELLITUS: ICD-10-CM

## 2025-05-15 LAB
25(OH)D3 SERPL-MCNC: <6 NG/ML (ref 30–100)
ALBUMIN SERPL-MCNC: 3.7 G/DL (ref 3.5–5)
ALBUMIN/GLOB SERPL: 1.3 (ref 1–1.9)
ALP SERPL-CCNC: 60 U/L (ref 35–104)
ALT SERPL-CCNC: 19 U/L (ref 8–45)
ANION GAP SERPL CALC-SCNC: 13 MMOL/L (ref 7–16)
AST SERPL-CCNC: 14 U/L (ref 15–37)
BASOPHILS # BLD: 0.03 K/UL (ref 0–0.2)
BASOPHILS NFR BLD: 0.4 % (ref 0–2)
BILIRUB SERPL-MCNC: 0.4 MG/DL (ref 0–1.2)
BUN SERPL-MCNC: 11 MG/DL (ref 6–23)
CALCIUM SERPL-MCNC: 9.1 MG/DL (ref 8.8–10.2)
CHLORIDE SERPL-SCNC: 107 MMOL/L (ref 98–107)
CHOLEST SERPL-MCNC: 252 MG/DL (ref 0–200)
CO2 SERPL-SCNC: 21 MMOL/L (ref 20–29)
CREAT SERPL-MCNC: 0.81 MG/DL (ref 0.6–1.1)
DIFFERENTIAL METHOD BLD: ABNORMAL
EOSINOPHIL # BLD: 0.08 K/UL (ref 0–0.8)
EOSINOPHIL NFR BLD: 1.2 % (ref 0.5–7.8)
ERYTHROCYTE [DISTWIDTH] IN BLOOD BY AUTOMATED COUNT: 12.5 % (ref 11.9–14.6)
EST. AVERAGE GLUCOSE BLD GHB EST-MCNC: 104 MG/DL
GLOBULIN SER CALC-MCNC: 2.9 G/DL (ref 2.3–3.5)
GLUCOSE SERPL-MCNC: 91 MG/DL (ref 70–99)
HBA1C MFR BLD: 5.2 % (ref 0–5.6)
HCT VFR BLD AUTO: 40.1 % (ref 35.8–46.3)
HDLC SERPL-MCNC: 37 MG/DL (ref 40–60)
HDLC SERPL: 6.8 (ref 0–5)
HGB BLD-MCNC: 14.1 G/DL (ref 11.7–15.4)
IMM GRANULOCYTES # BLD AUTO: 0.01 K/UL (ref 0–0.5)
IMM GRANULOCYTES NFR BLD AUTO: 0.1 % (ref 0–5)
LDLC SERPL CALC-MCNC: 152 MG/DL (ref 0–100)
LIPASE SERPL-CCNC: 50 U/L (ref 13–60)
LYMPHOCYTES # BLD: 2.02 K/UL (ref 0.5–4.6)
LYMPHOCYTES NFR BLD: 29.2 % (ref 13–44)
MAGNESIUM SERPL-MCNC: 2.2 MG/DL (ref 1.8–2.4)
MCH RBC QN AUTO: 31.3 PG (ref 26.1–32.9)
MCHC RBC AUTO-ENTMCNC: 35.2 G/DL (ref 31.4–35)
MCV RBC AUTO: 88.9 FL (ref 82–102)
MONOCYTES # BLD: 0.39 K/UL (ref 0.1–1.3)
MONOCYTES NFR BLD: 5.6 % (ref 4–12)
NEUTS SEG # BLD: 4.38 K/UL (ref 1.7–8.2)
NEUTS SEG NFR BLD: 63.5 % (ref 43–78)
NRBC # BLD: 0 K/UL (ref 0–0.2)
PLATELET # BLD AUTO: 233 K/UL (ref 150–450)
PMV BLD AUTO: 10.2 FL (ref 9.4–12.3)
POTASSIUM SERPL-SCNC: 3.7 MMOL/L (ref 3.5–5.1)
PROT SERPL-MCNC: 6.7 G/DL (ref 6.3–8.2)
RBC # BLD AUTO: 4.51 M/UL (ref 4.05–5.2)
SODIUM SERPL-SCNC: 141 MMOL/L (ref 136–145)
TRIGL SERPL-MCNC: 316 MG/DL (ref 0–150)
TSH W FREE THYROID IF ABNORMAL: 3.83 UIU/ML (ref 0.27–4.2)
VIT B12 SERPL-MCNC: 236 PG/ML (ref 193–986)
VLDLC SERPL CALC-MCNC: 63 MG/DL (ref 6–23)
WBC # BLD AUTO: 6.9 K/UL (ref 4.3–11.1)

## 2025-05-16 ENCOUNTER — RESULTS FOLLOW-UP (OUTPATIENT)
Dept: PRIMARY CARE CLINIC | Facility: CLINIC | Age: 45
End: 2025-05-16

## 2025-05-16 ENCOUNTER — TELEPHONE (OUTPATIENT)
Dept: PRIMARY CARE CLINIC | Facility: CLINIC | Age: 45
End: 2025-05-16

## 2025-05-16 DIAGNOSIS — E55.9 VITAMIN D DEFICIENCY: Primary | ICD-10-CM

## 2025-05-16 RX ORDER — ERGOCALCIFEROL 1.25 MG/1
50000 CAPSULE, LIQUID FILLED ORAL
Qty: 12 CAPSULE | Refills: 1 | Status: SHIPPED | OUTPATIENT
Start: 2025-05-16

## 2025-05-16 NOTE — TELEPHONE ENCOUNTER
Vitamin very low : goal is    Start Vitamin D supplement weekly .   B12 : low end 236 optimal around 700 can take B-12 supplement over the counter daily .     Will discuss rest of labs in her apt .   No other abnormalities noted .     Left vm asking patient to return the call regarding blood work results

## 2025-05-16 NOTE — TELEPHONE ENCOUNTER
Vitamin very low : goal is    Start Vitamin D supplement weekly .   B12 : low end 236 optimal around 700 can take B-12 supplement over the counter daily .     Will discuss rest of labs in her apt .   No other abnormalities noted .     I called and notified the pt of these results/recommendations.  The pt verbalized understanding.

## 2025-05-27 DIAGNOSIS — K52.9 CHRONIC DIARRHEA: ICD-10-CM

## 2025-05-29 LAB
G LAMBLIA AG STL QL IA: NEGATIVE
SPECIMEN SOURCE: NORMAL

## 2025-05-30 LAB
BACTERIA SPEC CULT: NORMAL
E COLI SXT STL QL IA: NEGATIVE
SERVICE CMNT-IMP: NORMAL
SPECIMEN SOURCE: NORMAL

## 2025-06-01 LAB
BACTERIA SPEC CULT: NORMAL
CAMPYLOBACTER STL CULT: NORMAL
SPECIMEN SOURCE: NORMAL
SPECIMEN SOURCE: NORMAL
YERSINIA RESULT 1: NORMAL
YERSINIA SPEC CULT: NORMAL

## 2025-06-02 LAB — BACTERIA SPEC CULT: NORMAL

## 2025-06-03 LAB
SPECIMEN SOURCE: NORMAL
VIBRIO STL CULT: NORMAL

## 2025-06-04 ENCOUNTER — OFFICE VISIT (OUTPATIENT)
Dept: PRIMARY CARE CLINIC | Facility: CLINIC | Age: 45
End: 2025-06-04
Payer: COMMERCIAL

## 2025-06-04 VITALS
SYSTOLIC BLOOD PRESSURE: 107 MMHG | TEMPERATURE: 98 F | BODY MASS INDEX: 26.63 KG/M2 | DIASTOLIC BLOOD PRESSURE: 78 MMHG | HEART RATE: 91 BPM | WEIGHT: 156 LBS | OXYGEN SATURATION: 96 % | HEIGHT: 64 IN

## 2025-06-04 DIAGNOSIS — E53.8 VITAMIN B12 DEFICIENCY: Primary | ICD-10-CM

## 2025-06-04 DIAGNOSIS — F41.1 GAD (GENERALIZED ANXIETY DISORDER): Chronic | ICD-10-CM

## 2025-06-04 DIAGNOSIS — K52.9 CHRONIC DIARRHEA: ICD-10-CM

## 2025-06-04 DIAGNOSIS — E55.9 VITAMIN D DEFICIENCY: ICD-10-CM

## 2025-06-04 DIAGNOSIS — K21.9 GASTROESOPHAGEAL REFLUX DISEASE WITHOUT ESOPHAGITIS: ICD-10-CM

## 2025-06-04 DIAGNOSIS — Z12.31 SCREENING MAMMOGRAM FOR BREAST CANCER: ICD-10-CM

## 2025-06-04 DIAGNOSIS — E78.2 MIXED HYPERLIPIDEMIA: ICD-10-CM

## 2025-06-04 LAB
O+P SPEC MICRO: NORMAL
O+P STL CONC: NORMAL
SPECIMEN SOURCE: NORMAL

## 2025-06-04 PROCEDURE — 99214 OFFICE O/P EST MOD 30 MIN: CPT

## 2025-06-04 NOTE — PROGRESS NOTES
[facility] [address]             [address] [address]             Tel:[phone]      Mary Medellin 1980 is a 44 y.o. female Established patient, here for evaluation of the following:     Pmhx   ADHD/depression/anxiety : on multiple medication seeing Psychiatrist at [facility]  .  Hx of facial nerve pain /fibromyalgia sees Neurologist and has seen pain management in the past.   Vitamin D: 2000 IU prescription strength 50.000 IU cause her nausea felt worse.  LDL: low fat diet exercise .  GI :   Patient c/o of diarrhea ,nausea ,vomiting for 3 months .  She had the flu around that time .  She doesn't have a PCP was seeing urgent care/primary care this past months for this issue ,no improvement . She took antibiotic for BV in march .  She denies blood,mucus ,smell in stool ,  Has not been able to eat much or drink severe nausea ,on and off vomiting has been bilious no blood  . Has loose weigh not able to eat much, hx of gallbladder removed in 2023 but this is the first time this has occurred. Has not been around anybody stomach virus .    06/04/2025 upcoming apt . Still has nausea diarrhea on and off .  Stool result negative for parasites and bacteria .      Labs results reviewed with patient voices understanding        Chief Complaint   Patient presents with    Results       HPI      Allergies   Allergen Reactions    Latex Rash    Azithromycin Swelling    Betamethasone Valerate     Cetirizine Swelling    Duloxetine Other (See Comments)     Sloughing inside of mouth    Duloxetine Hcl Other (See Comments)     Per pt oral sloughing    Hydroxyzine Hcl Other (See Comments)     headaches    Pimecrolimus      Past Medical History:   Diagnosis Date    Abnormal Pap smear 2000    with laser ablation     Abnormal Papanicolaou smear of cervix     ADHD (attention deficit hyperactivity disorder)     Allergic rhinitis 09/26/2016    Overactive ,sensitivities

## 2025-06-16 ENCOUNTER — COMMUNITY OUTREACH (OUTPATIENT)
Dept: PRIMARY CARE CLINIC | Facility: CLINIC | Age: 45
End: 2025-06-16

## 2025-06-16 NOTE — PROGRESS NOTES
Mary Medellin is 44 y.o. y/o female here for initial evaluation.     History of Present Illness  The patient is a 44-year-old female who presents for evaluation of diarrhea, nausea, and vomiting.    She has been experiencing intermittent episodes of diarrhea, nausea, and vomiting since the onset of 03/2025. The frequency of these episodes varies, with some days being symptom-free while others may involve up to eight episodes. These symptoms have significantly impacted her ability to consume food, often limiting her diet to toast. She reports that her symptoms are not necessarily triggered by food intake. For instance, she experienced diarrhea after consuming a single piece of toast yesterday, but did not have any further episodes after eating five onion rings later in the day. She also reported an episode of vomiting yesterday. She sought emergency care in 03/2025, during which a CT scan revealed a dilated duct. She has no history of undergoing a colonoscopy. She has been prescribed Imodium for her diarrhea.    She has a history of ulcers dating back 10 years, which she describes as intermittent and occasionally peptic. She experiences a burning sensation associated with these ulcers. She has been on Prilosec 20 mg for several years, but reports that it has not provided significant relief. She has not undergone an endoscopy.    She has had cholestasis.    SOCIAL HISTORY  Diet: Mostly toast, sometimes onion rings.       No procedures noted in the chart review.     CT abdomen pelvis w iv contrast only 3/18/2025  IMPRESSION:   FLUID THROUGHOUT COLON SUGGESTING DIARRHEAL STATE.     SMALL BOWEL MILDLY DISTENDED BY FLUID MAY SUGGEST ENTERITIS.     QUESTIONABLE CIRCUMFERENTIAL CERVICAL WALL THICKENING. RECOMMEND CLINICAL   CORRELATION FOR SYMPTOMATOLOGY. CONSIDER CORRELATION WITH PHYSICAL EXAM.     TRACE FREE PELVIC FLUID.     O&P neg  Stool cultures neg.       Lab Results   Component Value Date    HGB 14.1 05/15/2025

## 2025-06-19 ENCOUNTER — OFFICE VISIT (OUTPATIENT)
Dept: GASTROENTEROLOGY | Age: 45
End: 2025-06-19
Payer: COMMERCIAL

## 2025-06-19 ENCOUNTER — PREP FOR PROCEDURE (OUTPATIENT)
Dept: GASTROENTEROLOGY | Age: 45
End: 2025-06-19

## 2025-06-19 VITALS
RESPIRATION RATE: 15 BRPM | DIASTOLIC BLOOD PRESSURE: 96 MMHG | TEMPERATURE: 98 F | SYSTOLIC BLOOD PRESSURE: 143 MMHG | HEIGHT: 64 IN | OXYGEN SATURATION: 94 % | WEIGHT: 146 LBS | BODY MASS INDEX: 24.92 KG/M2 | HEART RATE: 80 BPM

## 2025-06-19 DIAGNOSIS — K21.9 GASTROESOPHAGEAL REFLUX DISEASE, UNSPECIFIED WHETHER ESOPHAGITIS PRESENT: Primary | ICD-10-CM

## 2025-06-19 DIAGNOSIS — K21.9 GASTROESOPHAGEAL REFLUX DISEASE, UNSPECIFIED WHETHER ESOPHAGITIS PRESENT: ICD-10-CM

## 2025-06-19 DIAGNOSIS — K52.9 CHRONIC DIARRHEA: Primary | ICD-10-CM

## 2025-06-19 DIAGNOSIS — Z12.11 ENCOUNTER FOR SCREENING COLONOSCOPY: ICD-10-CM

## 2025-06-19 DIAGNOSIS — Z12.11 COLON CANCER SCREENING: ICD-10-CM

## 2025-06-19 PROBLEM — R19.7 DIARRHEA: Status: ACTIVE | Noted: 2025-06-19

## 2025-06-19 PROCEDURE — 99204 OFFICE O/P NEW MOD 45 MIN: CPT | Performed by: STUDENT IN AN ORGANIZED HEALTH CARE EDUCATION/TRAINING PROGRAM

## 2025-06-19 RX ORDER — OMEPRAZOLE 40 MG/1
40 CAPSULE, DELAYED RELEASE ORAL
Qty: 180 CAPSULE | Refills: 1 | Status: SHIPPED | OUTPATIENT
Start: 2025-06-19 | End: 2025-06-19 | Stop reason: SDUPTHER

## 2025-06-19 RX ORDER — SODIUM CHLORIDE 0.9 % (FLUSH) 0.9 %
5-40 SYRINGE (ML) INJECTION PRN
Status: CANCELLED | OUTPATIENT
Start: 2025-06-19

## 2025-06-19 RX ORDER — SODIUM CHLORIDE 9 MG/ML
25 INJECTION, SOLUTION INTRAVENOUS PRN
Status: CANCELLED | OUTPATIENT
Start: 2025-06-19

## 2025-06-19 RX ORDER — SODIUM CHLORIDE 0.9 % (FLUSH) 0.9 %
5-40 SYRINGE (ML) INJECTION EVERY 12 HOURS SCHEDULED
Status: CANCELLED | OUTPATIENT
Start: 2025-06-19

## 2025-06-19 RX ORDER — OMEPRAZOLE 40 MG/1
40 CAPSULE, DELAYED RELEASE ORAL
Qty: 90 CAPSULE | Refills: 1 | Status: SHIPPED | OUTPATIENT
Start: 2025-06-19

## 2025-07-19 PROBLEM — Z12.11 ENCOUNTER FOR SCREENING COLONOSCOPY: Status: RESOLVED | Noted: 2025-06-19 | Resolved: 2025-07-19

## (undated) DEVICE — Device: Brand: PORTEX

## (undated) DEVICE — AMD ANTIMICROBIAL GAUZE SPONGES,12 PLY USP TYPE VII, 0.2% POLYHEXAMETHYLENE BIGUANIDE HCI (PHMB): Brand: CURITY

## (undated) DEVICE — STERILE POLYISOPRENE POWDER-FREE SURGICAL GLOVES: Brand: PROTEXIS

## (undated) DEVICE — KENDALL SCD EXPRESS SLEEVES, KNEE LENGTH, MEDIUM: Brand: KENDALL SCD

## (undated) DEVICE — PREMIUM WET SKIN PREP TRAY: Brand: MEDLINE INDUSTRIES, INC.

## (undated) DEVICE — SURGICAL PROCEDURE PACK C SECT CDS

## (undated) DEVICE — MEDI-VAC NON-CONDUCTIVE SUCTION TUBING: Brand: CARDINAL HEALTH

## (undated) DEVICE — CATH FOL TY IC BAG 16FR 2000ML -- CONVERT TO ITEM 363158

## (undated) DEVICE — SUTURE MCRYL SZ 3-0 L36IN ABSRB UD L36MM CT-1 1/2 CIR Y944H

## (undated) DEVICE — REM POLYHESIVE ADULT PATIENT RETURN ELECTRODE: Brand: VALLEYLAB

## (undated) DEVICE — SUTURE VCRL SZ 0 L36IN ABSRB UD L48MM CTX 1/2 CIR J978H

## (undated) DEVICE — PENCIL ES L3M BTTN SWCH S STL HEX LOK BLDE ELECTRD HOLSTER

## (undated) DEVICE — SOLUTION IRRIG 1000ML H2O STRL BLT

## (undated) DEVICE — SUTURE PDS II SZ 0 L27IN ABSRB VLT L36MM CT-1 1/2 CIR Z340H

## (undated) DEVICE — SUTURE PLN GUT SZ 2-0 L27IN ABSRB YELLOWISH TAN L40MM CT 853H

## (undated) DEVICE — SUTURE MCRYL SZ 4-0 L27IN ABSRB UD L19MM PS-2 1/2 CIR PRIM Y426H

## (undated) DEVICE — AMD ANTIMICROBIAL NON-ADHERENT PAD,0.2% POLYHEXAMETHYLENE BIGUANIDE HCI (PHMB): Brand: TELFA

## (undated) DEVICE — SUTURE MCRYL SZ 1 L36IN ABSRB UD L36MM CT-1 1/2 CIR Y947H

## (undated) DEVICE — SOLUTION IV 1000ML 0.9% SOD CHL